# Patient Record
Sex: MALE | Race: WHITE | NOT HISPANIC OR LATINO | URBAN - METROPOLITAN AREA
[De-identification: names, ages, dates, MRNs, and addresses within clinical notes are randomized per-mention and may not be internally consistent; named-entity substitution may affect disease eponyms.]

---

## 2018-10-16 ENCOUNTER — INPATIENT (INPATIENT)
Facility: HOSPITAL | Age: 83
LOS: 6 days | Discharge: ANOTHER HOSPITAL | End: 2018-10-23
Attending: INTERNAL MEDICINE | Admitting: INTERNAL MEDICINE
Payer: MEDICARE

## 2018-10-16 VITALS
TEMPERATURE: 98 F | RESPIRATION RATE: 16 BRPM | HEART RATE: 55 BPM | HEIGHT: 70 IN | OXYGEN SATURATION: 100 % | DIASTOLIC BLOOD PRESSURE: 56 MMHG | WEIGHT: 175.05 LBS | SYSTOLIC BLOOD PRESSURE: 160 MMHG

## 2018-10-16 LAB
ALBUMIN SERPL ELPH-MCNC: 3.6 G/DL — SIGNIFICANT CHANGE UP (ref 3.3–5)
ALP SERPL-CCNC: 50 U/L — SIGNIFICANT CHANGE UP (ref 40–120)
ALT FLD-CCNC: 12 U/L — SIGNIFICANT CHANGE UP (ref 12–78)
ANION GAP SERPL CALC-SCNC: 10 MMOL/L — SIGNIFICANT CHANGE UP (ref 5–17)
ANION GAP SERPL CALC-SCNC: 6 MMOL/L — SIGNIFICANT CHANGE UP (ref 5–17)
ANISOCYTOSIS BLD QL: SLIGHT — SIGNIFICANT CHANGE UP
APPEARANCE UR: CLEAR — SIGNIFICANT CHANGE UP
APPEARANCE UR: CLEAR — SIGNIFICANT CHANGE UP
AST SERPL-CCNC: 15 U/L — SIGNIFICANT CHANGE UP (ref 15–37)
BACTERIA # UR AUTO: ABNORMAL
BACTERIA # UR AUTO: ABNORMAL
BASOPHILS # BLD AUTO: 0.04 K/UL — SIGNIFICANT CHANGE UP (ref 0–0.2)
BASOPHILS NFR BLD AUTO: 0.6 % — SIGNIFICANT CHANGE UP (ref 0–2)
BILIRUB SERPL-MCNC: 0.5 MG/DL — SIGNIFICANT CHANGE UP (ref 0.2–1.2)
BILIRUB UR-MCNC: NEGATIVE — SIGNIFICANT CHANGE UP
BILIRUB UR-MCNC: NEGATIVE — SIGNIFICANT CHANGE UP
BUN SERPL-MCNC: 31 MG/DL — HIGH (ref 7–23)
BUN SERPL-MCNC: 36 MG/DL — HIGH (ref 7–23)
CALCIUM SERPL-MCNC: 8.7 MG/DL — SIGNIFICANT CHANGE UP (ref 8.5–10.1)
CALCIUM SERPL-MCNC: 9 MG/DL — SIGNIFICANT CHANGE UP (ref 8.5–10.1)
CHLORIDE SERPL-SCNC: 108 MMOL/L — SIGNIFICANT CHANGE UP (ref 96–108)
CHLORIDE SERPL-SCNC: 111 MMOL/L — HIGH (ref 96–108)
CO2 SERPL-SCNC: 22 MMOL/L — SIGNIFICANT CHANGE UP (ref 22–31)
CO2 SERPL-SCNC: 26 MMOL/L — SIGNIFICANT CHANGE UP (ref 22–31)
COLOR SPEC: YELLOW — SIGNIFICANT CHANGE UP
COLOR SPEC: YELLOW — SIGNIFICANT CHANGE UP
CREAT SERPL-MCNC: 1.51 MG/DL — HIGH (ref 0.5–1.3)
CREAT SERPL-MCNC: 1.74 MG/DL — HIGH (ref 0.5–1.3)
DACRYOCYTES BLD QL SMEAR: SLIGHT — SIGNIFICANT CHANGE UP
DIFF PNL FLD: ABNORMAL
DIFF PNL FLD: ABNORMAL
EOSINOPHIL # BLD AUTO: 0.17 K/UL — SIGNIFICANT CHANGE UP (ref 0–0.5)
EOSINOPHIL NFR BLD AUTO: 2.6 % — SIGNIFICANT CHANGE UP (ref 0–6)
EPI CELLS # UR: ABNORMAL
EPI CELLS # UR: SIGNIFICANT CHANGE UP
GLUCOSE SERPL-MCNC: 108 MG/DL — HIGH (ref 70–99)
GLUCOSE SERPL-MCNC: 138 MG/DL — HIGH (ref 70–99)
GLUCOSE UR QL: NEGATIVE MG/DL — SIGNIFICANT CHANGE UP
GLUCOSE UR QL: NEGATIVE MG/DL — SIGNIFICANT CHANGE UP
HCT VFR BLD CALC: 33.6 % — LOW (ref 39–50)
HGB BLD-MCNC: 11.2 G/DL — LOW (ref 13–17)
IMM GRANULOCYTES NFR BLD AUTO: 0.2 % — SIGNIFICANT CHANGE UP (ref 0–1.5)
KETONES UR-MCNC: NEGATIVE — SIGNIFICANT CHANGE UP
KETONES UR-MCNC: NEGATIVE — SIGNIFICANT CHANGE UP
LEUKOCYTE ESTERASE UR-ACNC: ABNORMAL
LEUKOCYTE ESTERASE UR-ACNC: NEGATIVE — SIGNIFICANT CHANGE UP
LYMPHOCYTES # BLD AUTO: 1.83 K/UL — SIGNIFICANT CHANGE UP (ref 1–3.3)
LYMPHOCYTES # BLD AUTO: 28.3 % — SIGNIFICANT CHANGE UP (ref 13–44)
MACROCYTES BLD QL: SLIGHT — SIGNIFICANT CHANGE UP
MANUAL SMEAR VERIFICATION: SIGNIFICANT CHANGE UP
MCHC RBC-ENTMCNC: 33.3 GM/DL — SIGNIFICANT CHANGE UP (ref 32–36)
MCHC RBC-ENTMCNC: 35.1 PG — HIGH (ref 27–34)
MCV RBC AUTO: 105.3 FL — HIGH (ref 80–100)
MONOCYTES # BLD AUTO: 0.9 K/UL — SIGNIFICANT CHANGE UP (ref 0–0.9)
MONOCYTES NFR BLD AUTO: 13.9 % — SIGNIFICANT CHANGE UP (ref 2–14)
NEUTROPHILS # BLD AUTO: 3.51 K/UL — SIGNIFICANT CHANGE UP (ref 1.8–7.4)
NEUTROPHILS NFR BLD AUTO: 54.4 % — SIGNIFICANT CHANGE UP (ref 43–77)
NITRITE UR-MCNC: NEGATIVE — SIGNIFICANT CHANGE UP
NITRITE UR-MCNC: POSITIVE
NRBC # BLD: 0 /100 WBCS — SIGNIFICANT CHANGE UP (ref 0–0)
OVALOCYTES BLD QL SMEAR: SLIGHT — SIGNIFICANT CHANGE UP
PH UR: 5 — SIGNIFICANT CHANGE UP (ref 5–8)
PH UR: 6.5 — SIGNIFICANT CHANGE UP (ref 5–8)
PLAT MORPH BLD: NORMAL — SIGNIFICANT CHANGE UP
PLATELET # BLD AUTO: 201 K/UL — SIGNIFICANT CHANGE UP (ref 150–400)
POIKILOCYTOSIS BLD QL AUTO: SLIGHT — SIGNIFICANT CHANGE UP
POLYCHROMASIA BLD QL SMEAR: SLIGHT — SIGNIFICANT CHANGE UP
POTASSIUM SERPL-MCNC: 5.3 MMOL/L — SIGNIFICANT CHANGE UP (ref 3.5–5.3)
POTASSIUM SERPL-MCNC: 5.5 MMOL/L — HIGH (ref 3.5–5.3)
POTASSIUM SERPL-SCNC: 5.3 MMOL/L — SIGNIFICANT CHANGE UP (ref 3.5–5.3)
POTASSIUM SERPL-SCNC: 5.5 MMOL/L — HIGH (ref 3.5–5.3)
PROT SERPL-MCNC: 6.6 GM/DL — SIGNIFICANT CHANGE UP (ref 6–8.3)
PROT UR-MCNC: 15 MG/DL
PROT UR-MCNC: 15 MG/DL
RBC # BLD: 3.19 M/UL — LOW (ref 4.2–5.8)
RBC # FLD: 12.9 % — SIGNIFICANT CHANGE UP (ref 10.3–14.5)
RBC BLD AUTO: ABNORMAL
RBC CASTS # UR COMP ASSIST: >50 /HPF (ref 0–4)
RBC CASTS # UR COMP ASSIST: ABNORMAL /HPF (ref 0–4)
SCHISTOCYTES BLD QL AUTO: SLIGHT — SIGNIFICANT CHANGE UP
SODIUM SERPL-SCNC: 140 MMOL/L — SIGNIFICANT CHANGE UP (ref 135–145)
SODIUM SERPL-SCNC: 143 MMOL/L — SIGNIFICANT CHANGE UP (ref 135–145)
SP GR SPEC: 1.01 — SIGNIFICANT CHANGE UP (ref 1.01–1.02)
SP GR SPEC: 1.01 — SIGNIFICANT CHANGE UP (ref 1.01–1.02)
UROBILINOGEN FLD QL: NEGATIVE MG/DL — SIGNIFICANT CHANGE UP
UROBILINOGEN FLD QL: NEGATIVE MG/DL — SIGNIFICANT CHANGE UP
WBC # BLD: 6.46 K/UL — SIGNIFICANT CHANGE UP (ref 3.8–10.5)
WBC # FLD AUTO: 6.46 K/UL — SIGNIFICANT CHANGE UP (ref 3.8–10.5)
WBC UR QL: ABNORMAL
WBC UR QL: ABNORMAL

## 2018-10-16 PROCEDURE — 70450 CT HEAD/BRAIN W/O DYE: CPT | Mod: 26

## 2018-10-16 PROCEDURE — 99284 EMERGENCY DEPT VISIT MOD MDM: CPT

## 2018-10-16 RX ORDER — ATENOLOL 25 MG/1
50 TABLET ORAL ONCE
Qty: 0 | Refills: 0 | Status: COMPLETED | OUTPATIENT
Start: 2018-10-16 | End: 2018-10-16

## 2018-10-16 RX ORDER — CEFTRIAXONE 500 MG/1
INJECTION, POWDER, FOR SOLUTION INTRAMUSCULAR; INTRAVENOUS
Qty: 0 | Refills: 0 | Status: DISCONTINUED | OUTPATIENT
Start: 2018-10-16 | End: 2018-10-16

## 2018-10-16 RX ORDER — ATENOLOL 25 MG/1
50 TABLET ORAL DAILY
Qty: 0 | Refills: 0 | Status: DISCONTINUED | OUTPATIENT
Start: 2018-10-16 | End: 2018-10-20

## 2018-10-16 RX ORDER — QUETIAPINE FUMARATE 200 MG/1
25 TABLET, FILM COATED ORAL AT BEDTIME
Qty: 0 | Refills: 0 | Status: DISCONTINUED | OUTPATIENT
Start: 2018-10-16 | End: 2018-10-18

## 2018-10-16 RX ORDER — ENOXAPARIN SODIUM 100 MG/ML
40 INJECTION SUBCUTANEOUS DAILY
Qty: 0 | Refills: 0 | Status: DISCONTINUED | OUTPATIENT
Start: 2018-10-16 | End: 2018-10-18

## 2018-10-16 RX ORDER — QUETIAPINE FUMARATE 200 MG/1
12.5 TABLET, FILM COATED ORAL DAILY
Qty: 0 | Refills: 0 | Status: DISCONTINUED | OUTPATIENT
Start: 2018-10-16 | End: 2018-10-17

## 2018-10-16 RX ORDER — AMLODIPINE BESYLATE 2.5 MG/1
10 TABLET ORAL ONCE
Qty: 0 | Refills: 0 | Status: COMPLETED | OUTPATIENT
Start: 2018-10-16 | End: 2018-10-16

## 2018-10-16 RX ORDER — HALOPERIDOL DECANOATE 100 MG/ML
2 INJECTION INTRAMUSCULAR EVERY 4 HOURS
Qty: 0 | Refills: 0 | Status: DISCONTINUED | OUTPATIENT
Start: 2018-10-16 | End: 2018-10-23

## 2018-10-16 RX ORDER — CEFTRIAXONE 500 MG/1
1000 INJECTION, POWDER, FOR SOLUTION INTRAMUSCULAR; INTRAVENOUS EVERY 24 HOURS
Qty: 0 | Refills: 0 | Status: DISCONTINUED | OUTPATIENT
Start: 2018-10-16 | End: 2018-10-18

## 2018-10-16 RX ORDER — AMLODIPINE BESYLATE 2.5 MG/1
10 TABLET ORAL DAILY
Qty: 0 | Refills: 0 | Status: DISCONTINUED | OUTPATIENT
Start: 2018-10-16 | End: 2018-10-23

## 2018-10-16 RX ORDER — HALOPERIDOL DECANOATE 100 MG/ML
2 INJECTION INTRAMUSCULAR ONCE
Qty: 0 | Refills: 0 | Status: COMPLETED | OUTPATIENT
Start: 2018-10-16 | End: 2018-10-16

## 2018-10-16 RX ORDER — SODIUM CHLORIDE 9 MG/ML
1000 INJECTION INTRAMUSCULAR; INTRAVENOUS; SUBCUTANEOUS
Qty: 0 | Refills: 0 | Status: DISCONTINUED | OUTPATIENT
Start: 2018-10-16 | End: 2018-10-17

## 2018-10-16 RX ORDER — HALOPERIDOL DECANOATE 100 MG/ML
5 INJECTION INTRAMUSCULAR ONCE
Qty: 0 | Refills: 0 | Status: COMPLETED | OUTPATIENT
Start: 2018-10-16 | End: 2018-10-16

## 2018-10-16 RX ADMIN — HALOPERIDOL DECANOATE 2 MILLIGRAM(S): 100 INJECTION INTRAMUSCULAR at 19:10

## 2018-10-16 RX ADMIN — HALOPERIDOL DECANOATE 2 MILLIGRAM(S): 100 INJECTION INTRAMUSCULAR at 15:27

## 2018-10-16 RX ADMIN — SODIUM CHLORIDE 100 MILLILITER(S): 9 INJECTION INTRAMUSCULAR; INTRAVENOUS; SUBCUTANEOUS at 21:10

## 2018-10-16 RX ADMIN — Medication 2 MILLIGRAM(S): at 04:40

## 2018-10-16 RX ADMIN — HALOPERIDOL DECANOATE 2 MILLIGRAM(S): 100 INJECTION INTRAMUSCULAR at 23:03

## 2018-10-16 RX ADMIN — HALOPERIDOL DECANOATE 5 MILLIGRAM(S): 100 INJECTION INTRAMUSCULAR at 04:40

## 2018-10-16 RX ADMIN — CEFTRIAXONE 1000 MILLIGRAM(S): 500 INJECTION, POWDER, FOR SOLUTION INTRAMUSCULAR; INTRAVENOUS at 16:38

## 2018-10-16 NOTE — H&P ADULT - NSHPLABSRESULTS_GEN_ALL_CORE
11.2   6.46  )-----------( 201      ( 16 Oct 2018 02:28 )             33.6     10-16    143  |  111<H>  |  36<H>  ----------------------------<  108<H>  5.5<H>   |  26  |  1.74<H>    Ca    9.0      16 Oct 2018 02:28    TPro  6.6  /  Alb  3.6  /  TBili  0.5  /  DBili  x   /  AST  15  /  ALT  12  /  AlkPhos  50  10-16    LIVER FUNCTIONS - ( 16 Oct 2018 02:28 )  Alb: 3.6 g/dL / Pro: 6.6 gm/dL / ALK PHOS: 50 U/L / ALT: 12 U/L / AST: 15 U/L / GGT: x           < from: CT Head No Cont (10.16.18 @ 02:39) >    IMPRESSION:   No acute intracranial hemorrhage, mass effect, or evidenceof acute   vascular territorial infarction.    If clinical symptoms persist or worsen, more sensitive evaluation with   brain MRI may be obtained, if no contraindications exist.    < end of copied text >

## 2018-10-16 NOTE — ED PROVIDER NOTE - MEDICAL DECISION MAKING DETAILS
pt with dementia living at home with wife as care taker, increase in agitation, not cooperative at home, will get baseline labs, ct head and ua will get sw consult in the am

## 2018-10-16 NOTE — ED ADULT NURSE NOTE - NSFALLRSKPASTHIST_ED_ALL_ED
Pt here c/o edema to right side of penis. He had I/D done yesterday at 1530. Denies pain and did not notice till 1 hour PTA. States heating pad decreased the majority of sx   no

## 2018-10-16 NOTE — PROGRESS NOTE ADULT - SUBJECTIVE AND OBJECTIVE BOX
F/u bladder scan  has 614 cc w/bladder scan, UA +; staff / spouse reports pt c/o pain with urination (urinated minute amt)  A/p   urinary retention with CORINA d/t UTI  - keep beaulieu  - start IV rocephin  - f/u Ucx  - con't IVF

## 2018-10-16 NOTE — GOALS OF CARE CONVERSATION - PERSONAL ADVANCE DIRECTIVE - CONVERSATION DETAILS
Met with family to discuss medical issues and overall GOC. They explained that before this pt was doing quite well. He has had dementia for many years, more recently progressing to include sundowning in evenings. Wife noted one episode of confusion that led to pt climbing out of window and falling on rocks where police were called (2016) but no such occurrence since then, until today. They note that at baseline, pt is confused, sometimes recognizing loved ones sometimes not, mostly confabulating, but pleasant and alert. He was able to walk without assistive devices, dress, toilet (no incontinence), and feed himself. Neighbors and nieces/nephews have been checking in from time to time and sometimes relieve wife when she needs to go out and do things. However, she is worried that if this behavior persists she will not be able to care for him. She would like him home ultimately, but needs help with planning.     Reviewed medical issues, all labs thus far, stef, waiting for UA as UTIs can cause delirium in the elderly, and also plans for PT. Also reviewed approach to symptom (delirium) management which they were able to understand and agreed with. They agreed that workup needed to be completed and PT evaluation in place before truly knowing pt's trajectory.     Offered to review options for further support which they were fully open to. They noted understanding that dementia is a progressive illness. Reviewed all levels of home care including baseline home care (with skilled nursing needs, PT if recommended), home palliative care (if [facility] not recommended/eligible for home care this could help with sx management), and lastly, if dementia progressed to specific levels of cognitive and functional decline, eligibility for hospice. Explained that at this time, with above outlined baseline function pt is not likely to fit hospice criteria, however explained that if this changes they could inquire about assessment/referral at any time in the future. Spent time dispelling some myths that wife had about hospice, including that it must take place in a facility. Reviewed full benefit profile for their knowledge in the future.     Given above, spent time focusing on plans that pt would be more likely to be eligible for. Noted need for PT assessment to dictate if pt should return home with PT or go to [facility]. The latter giving family more time to plan with social workers (including medicaid specialists if eligible) for safe home plan. Joshua notes that she could provide pt with home health aide care, as she has done this in the past. However, she agreed that wife should have more information about this. Explained that aides are usually an out-of-pocket expense unless they had medicaid or hospice. However, reassured her that we would help link her up with social work to support her in figuring out these options further. Likewise, they agreed with us offering to meet again once workup was complete and PT eval was done to help further streamline planning including completion of MOLST form.     In light of progressive illness and need for assistance, spent some time discussing advanced directives. Mrs. Salazar denied pt having a proxy, but noted that she was aware of his wishes. When asked, she noted that she would not want pt to have CPR or intubation, not wanting him to endure that and willing to sign paperwork to this effect at end of encounter. She and her niece both agreed that completing a MOLST prior to dc would help further clarify these wishes for clinicians in all settings in the future.     Pt's wife shared that she was overwhelmed by all that has already happened and felt a little disappointed by not being able to help her . Normalized this emotion and let her know that everyone has their limits, including physicians, and that there is no shame in asking for help. Reassured her that teams here are committed to helping her through this process as best we can, which she was appreciative of.     Plan to c/w current interventions and workup and meet again later in week to finalize wishes/plan once trajectory is clearer.

## 2018-10-16 NOTE — ED PROVIDER NOTE - CONSTITUTIONAL, MLM
normal... Well appearing, well nourished, awake, alert, oriented to person,  and in no apparent distress. pleasantly confused and cooperative

## 2018-10-16 NOTE — CONSULT NOTE ADULT - ASSESSMENT
89y old Male coming from home with hx of Dementia (since 2008, FAST 5), HTN, admitted 10/16 for worsening confusion. As per wife and niece pt's confusion has worsened recently mostly confused at nights and less in the day time, does not recognize her or family members, but his confusion has worsened within the past 24 hours. Spouse and niece note that pt is ambulatory at baseline (without any device), able to dress, toilet, and feed self. Found here to have mild elevation in Cr (1.7), hyperkalemia (5.5), and CTH negative for acute pathology, pending urinary studies with 600cc on bedside bladder scan. Palliative Care consulted to assist with establishing GOC.     1) AMS/Dementia  - hx of dementia, FAST5-6, not end stage yet  - CTH ruling out any acute pathology  - likely sundowning  - possibly also contributed to by infection, awaiting UA  - agree with seroquel 25mg qhs and 12.5mg qd  - also agree with back up dose of haldol 2mg IM q4h prn    2) CORINA  - likely prerenal  - IVF  - encourage po intake, which wife says he usually drinks well  - c/w monitoring    3) Debility  - seems to have good physical function at home until today  - some evidence of muscle/fat wasting  - rec PT eval cc: [facility]    4) Prognosis  - likely overall poor  - progressive dementia, though not yet at end stage. Unclear though what new baseline will be. If does not rally or continues to decline would possibly then be a hospice candidate, not so at this time.     5) GOC/Advanced Directives  - pt does not have capacity for decision making  - no HCP, surrogate is wife: Vivienne: [phone]  - DNR and DNI, completed today  - GOC meeting held- family hoping for support in figuring out how best to support him after this, interested in [facility] assessment and open to full discussion of all options. *Spent 40 minutes discussing GOC with family including Advance care planning, explanation and discussion of advance directives, reviewed all dispo options, broached option to complete MOLST if plan for [facility], and finalized DNR/DNI form. See GOC note for further details.    Thank you for including us in Mr. Salazar's care. Will continue to follow with you.    Codey Stubbs MD  Palliative Care Attending

## 2018-10-16 NOTE — ED PROVIDER NOTE - PROGRESS NOTE DETAILS
[place] GHOSH: Received s/o from Dr. Sellers- per - recommending medical admission at this time. Endorsed to hospitalist; still pending urine at this time.

## 2018-10-16 NOTE — ED ADULT NURSE NOTE - NSIMPLEMENTINTERV_GEN_ALL_ED
Implemented All Fall with Harm Risk Interventions:  [place] to call system. Call bell, personal items and telephone within reach. Instruct patient to call for assistance. Room bathroom lighting operational. Non-slip footwear when patient is off stretcher. Physically safe environment: no spills, clutter or unnecessary equipment. Stretcher in lowest position, wheels locked, appropriate side rails in place. Provide visual cue, wrist band, yellow gown, etc. Monitor gait and stability. Monitor for mental status changes and reorient to person, place, and time. Review medications for side effects contributing to fall risk. Reinforce activity limits and safety measures with patient and family. Provide visual clues: red socks.

## 2018-10-16 NOTE — CONSULT NOTE ADULT - SUBJECTIVE AND OBJECTIVE BOX
HPI: Mr. Salazar is a 89y old Male coming from home with hx of Dementia (since 2008, FAST 5), HTN, admitted 10/16 for worsening confusion. As per wife and niece pt's confusion has worsened recently mostly confused at nights and less in the day time, does not recognize her or family members, but his confusion has worsened within the past 24 hours. Spouse and niece note that pt is ambulatory at baseline (without any device), able to dress, toilet, and feed self. Found here to have mild elevation in Cr (1.7), hyperkalemia (5.5), and CTH negative for acute pathology, pending urinary studies with 600cc on bedside bladder scan. Palliative Care consulted to assist with establishing GOC.     Met Mr. Salazar, wife, niece, and hospital aid at bedside. Pt mildly agitated, moving around in bed, but easily calmed and refocused. He denied knowing where he was, time (date or president), only able to orient to self. Denied any pain or discomfort. He was able to follow simple commands and was pleasant. He was unable to contribute to HPI, family willing to meet separately to discuss GOC, see GOC note for additional details.       PAIN: ( )Yes   (x )No  DYSPNEA: ( ) Yes  (x ) No    PAST MEDICAL & SURGICAL HISTORY:  Essential hypertension  Dementia  No significant past surgical history      SOCIAL HX: Lives with his wife, nieces and nephews live close by    Hx opiate tolerance ( )YES  (x )NO    Baseline ADLs  (Prior to Admission)- as described above, though does not handle IADLs  (x ) Independent   ( )Dependent    FAMILY HISTORY:  Unable to gather 2/2 to dementia      Review of Systems:    Anxiety- yes  Nausea- denies  Delirium- yes    Otherwise unable to gather 2/2 to dementia      PHYSICAL EXAM:    Vital Signs Last 24 Hrs  T(C): 36.5 (16 Oct 2018 00:40), Max: 36.5 (16 Oct 2018 00:40)  T(F): 97.7 (16 Oct 2018 00:40), Max: 97.7 (16 Oct 2018 00:40)  HR: 54 (16 Oct 2018 11:23) (52 - 55)  BP: 142/64 (16 Oct 2018 11:23) (142/64 - 160/56)  BP(mean): --  RR: 18 (16 Oct 2018 08:05) (16 - 18)  SpO2: 99% (16 Oct 2018 11:23) (99% - 100%)  Daily Height in cm: 177.8 (16 Oct 2018 00:40)    Daily     PPSV2: 30  %  FAST: 5    General: Elderly male sitting up in bed, climbing somewhat on rails, smiling, cooperative when redirected follows instructions, but easily distracted  Mental Status: AOx1 (self only)  HEENT: dmm, perrl, eomi, some temporal wasting  Lungs: clear  Cardiac: +s1 s2 rrr  GI: soft nt nd +bs  : retaining urine  Ext: moves all extremities, no edema  Neuro: no focal deficits outside of confusion      LABS:                        11.2   6.46  )-----------( 201      ( 16 Oct 2018 02:28 )             33.6     10-16    143  |  111<H>  |  36<H>  ----------------------------<  108<H>  5.5<H>   |  26  |  1.74<H>    Ca    9.0      16 Oct 2018 02:28    TPro  6.6  /  Alb  3.6  /  TBili  0.5  /  DBili  x   /  AST  15  /  ALT  12  /  AlkPhos  50  10-16      Albumin: Albumin, Serum: 3.6 g/dL (10-16 @ 02:28)      Allergies    No Known Allergies    Intolerances      MEDICATIONS  (STANDING):  amLODIPine   Tablet 10 milliGRAM(s) Oral daily  ATENolol  Tablet 50 milliGRAM(s) Oral daily  enoxaparin Injectable 40 milliGRAM(s) SubCutaneous daily  haloperidol    Injectable 2 milliGRAM(s) IntraMuscular once  QUEtiapine 12.5 milliGRAM(s) Oral daily  QUEtiapine 25 milliGRAM(s) Oral at bedtime  sodium chloride 0.9%. 1000 milliLiter(s) (100 mL/Hr) IV Continuous <Continuous>    MEDICATIONS  (PRN):  haloperidol    Injectable 2 milliGRAM(s) IntraMuscular every 4 hours PRN agitation      RADIOLOGY/ADDITIONAL STUDIES:    EXAM:  CT BRAIN                        PROCEDURE DATE:  10/16/2018      IMPRESSION:     No acute intracranial hemorrhage, mass effect, or evidence of acute   vascular territorial infarction.    If clinical symptoms persist or worsen, more sensitive evaluation with   brain MRI may be obtained, if no contraindications exist.      YUSUF JERNIGAN M.D., ATTENDING RADIOLOGIST  This document has been electronically signed. Oct 16 2018  4:18AM

## 2018-10-16 NOTE — ED ADULT NURSE NOTE - OBJECTIVE STATEMENT
pt presents to the ED with wife stating patient has been disoriented since 2008. Never diagnosed with Alzheimer's or dementia. Wife states since 2008 patients condition has worsened and tonight patient wanted to get out of house and she was worried and did not know how to handle him. Wife states she lives at home alone with , that he no longer recognizes her and that she is unsure of how to care for him. Patient calm and cooperative. pt offers no complaints.

## 2018-10-16 NOTE — H&P ADULT - NSHPPHYSICALEXAM_GEN_ALL_CORE
PHYSICAL EXAM:    GENERAL: active / mild to moderate agitated in bed NAD    HEENT:  pupils equal and reactive, no oropharyngeal lesions, erythema, exudates, oral thrush    NECK:   supple, no carotid bruits, no palpable lymph nodes, no thyromegaly    CV:  +S1, +S2, regular    RESP:   lungs clear to auscultation bilaterally, no wheezing, rales, rhonchi, good air entry bilaterally    BREAST:  not examined    GI:  abdomen soft, non-tender, non-distended, normal BS, no bruits, no abdominal masses, no palpable masses    RECTAL:  not examined    :  not examined    MSK:  +kyphosis, normal muscle tone, no atrophy, no rigidity, no contractions    EXT: chronic vascular changes to BLE,  no clubbing, no cyanosis, no edema, no calf pain, swelling or erythema    VASCULAR:  pulses equal and symmetric in the upper and lower extremities    NEURO:  AAOX3, , no pronation drift, lifts and resist BUE/BLE strongly 5/5, tongue midline, no focal neurological deficits, follows all commands, able to move extremities spontaneously    SKIN:  no ulcers, lesions or rashes PHYSICAL EXAM:    GENERAL: active / mild to moderate agitated in bed NAD    HEENT:  pupils equal and reactive, no oropharyngeal lesions, erythema, exudates, oral thrush    NECK:   supple, no carotid bruits, no palpable lymph nodes, no thyromegaly    CV:  +S1, +S2, regular    RESP:   lungs clear to auscultation bilaterally, no wheezing, rales, rhonchi, good air entry bilaterally    BREAST:  not examined    GI:  abdomen soft, non-distended, normal BS, no bruits, no abdominal masses, no palpable masses    RECTAL:  not examined    :  no indication of suprapubic tenderness    MSK:  normal muscle tone, no atrophy, no rigidity, no contractions    EXT:  no clubbing, no cyanosis, no edema, no swelling or erythema    VASCULAR:  pulses equal and symmetric in the upper and lower extremities    NEURO:  AAOX1 (name) does not follow commands well, AROM, active in bed    SKIN:  no ulcers, lesions or rashes

## 2018-10-16 NOTE — H&P ADULT - NSHPREVIEWOFSYSTEMS_GEN_ALL_CORE
REVIEW OF SYSTEMS: Info gathered from pt's spouse, and niece "Brian" as pt is clinically unable to relay information    General: no fever, chills    Skin/Breast: no changes  	  Ophthalmologic: no vision changes  	  ENMT:  no issues    Respiratory and Thorax: no cough or recent URI's  	  Cardiovascular: no issues, no hx of CVD    Gastrointestinal: normal BM, no issues    Genitourinary: incontinent at times    Musculoskeletal: no joint swelling or tenderness    Neurological: dementia, progressively worsening at its worst past months at its worst overnight    Psychiatric: no suicidal hx no depression/anxiety	    Hematology/Lymphatics: normal	    Endocrine: normal    Allergic/Immunologic:	      REVIEW OF SYSTEM: ROS comprehensively negative except as above

## 2018-10-16 NOTE — H&P ADULT - ATTENDING COMMENTS
Patient is seen and examined, labs and imaging studies reviewed. Plan of care d/w NP Linda, wife and daughter at bedside  Start Seroquel standing +Haldol PRN for agitation  SW eval for placement  IVF and bladder scan  Monitor renal function    Agree with above H&P

## 2018-10-16 NOTE — H&P ADULT - ASSESSMENT
AMS likely worsening Alzheimers' / Dementia with behavior symptoms vs metabolic encephalopathy w/ borderline K and CORINA  - pt with worsening AMS with past "few days" as per spouse & Niece-->"I saw him last week and he was fine"  - 1:1 at bedside reports pt has been trying to urinate but ''says its not coming"  - bladder scan--> straight cath if >200 & check UA  - start seroquel 12.5 mg am / 25 mg HS; haldol PRN   - safety precautions / 1:1  - monitor  - SW / case management    CORINA vs CORINA on CKD (crt 1.74)  - baseline crt unk  - spouse reports his po intake has been good until yesterday PM he "didn't eat anything"  - receck BMP    Essential HTN  - BP slightly above goal  - con't home meds  - managed agitation     IMPROVE VTE Individual Risk Assessment    RISK                                                                Points    [  ] Previous VTE                                                  3    [  ] Thrombophilia                                               2    [  ] Lower limb paralysis                                      2        (unable to hold up >15 seconds)      [  ] Current Cancer                                              2         (within 6 months)    [  ] Immobilization > 24 hrs                                1    [  ] ICU/CCU stay > 24 hours                              1    [ x ] Age > 60                                                      1    IMPROVE VTE Score _________    DVT PPX  - coumadin    Dispo  - goals of care d/w pt's spouse -->DNR but wishes goals of care conversation with palliative team first  - plan of care d/w pt's spouse and niece at bedside AMS likely worsening Alzheimers' / Dementia with behavior symptoms vs metabolic encephalopathy w/ borderline K and CORINA  - pt with worsening AMS with past "few days" as per spouse & Niece-->"I saw him last week and he was fine"  - 1:1 at bedside reports pt has been trying to urinate but ''says its not coming"  - bladder scan--> straight cath if >200 & check UA  - start seroquel 12.5 mg am / 25 mg HS; haldol PRN   - safety precautions / 1:1  - monitor  - SW / case management    CORINA vs CORINA on CKD (crt 1.74)  - baseline crt unk  - spouse reports his po intake has been good until yesterday PM he "didn't eat anything"  - receck BMP    Essential HTN  - BP slightly above goal  - con't home meds  - managed agitation     IMPROVE VTE Individual Risk Assessment    RISK                                                                Points    [  ] Previous VTE                                                  3    [  ] Thrombophilia                                               2    [  ] Lower limb paralysis                                      2        (unable to hold up >15 seconds)      [  ] Current Cancer                                              2         (within 6 months)    [  ] Immobilization > 24 hrs                                1    [  ] ICU/CCU stay > 24 hours                              1    [ x ] Age > 60                                                      1    IMPROVE VTE Score _________    DVT PPX  - lovenox    Dispo  - goals of care d/w pt's spouse -->DNR but wishes goals of care conversation with palliative team first  - plan of care d/w pt's spouse and niece at bedside

## 2018-10-16 NOTE — H&P ADULT - HISTORY OF PRESENT ILLNESS
89 year old Man with pmhx of HTN, dementia, BIBA accompanied by spouse for worsening confusion. As per wife and niece pt's confusion has worsened recently. Spouse reports pt is usually very confused at nights and less in the day time, does not recognize her or family members, but his confusion has worsened within the past 24 hours. Spouse and niece that pt is ambulatory at baseline, able to toilet self   and feed self.     In ED CT head negative for acute pathology, K 5.5, Crt 1.73, /56

## 2018-10-16 NOTE — ED ADULT NURSE REASSESSMENT NOTE - NS ED NURSE REASSESS COMMENT FT1
Patient had become agitated and attempted to climb out of bed and pull out the beaulieu catheter.  Patient medicated at this time.  Beaulieu catheter placement verified patient noted to have small amount of blood at meatus.  Patient given turkey dinner and assisted to eat.  Family at bedside at this time aware of plan of care  Patient maintained on 1:1 observation.  Beaulieu catheter emptied 800cc.  Report given to Tunde SILVERIO .
patient resting comfortably.  Incontinent of stool linens changed and skin care given patient repositioned.  Right IV placed and BMP drawn.  Bladder scan done 614cc of urine noted in bladder.  Jimenez Catheter placed for urinary retention.  350cc of cloudy yellow urine returned.  U/A and C&S sent to lab.  Patient family at bedside aware of plan of care.  VS stable as charted will continue to monitor patient condition.
pt assisted to bathroom, unable to urinate. pt refused straight cath. gave pt 2 glasses of water and will attempt again later.
CO maintained
Pt resting in bed 7 . 1;1 at bed side for safety . Iv site is clear . Family at bed side helpful with care.  Call bell is with in reach and safety is being maintained . Will monitor till transport.  Report given to TCU

## 2018-10-16 NOTE — ED ADULT TRIAGE NOTE - CHIEF COMPLAINT QUOTE
Wife states patient has been disoriented since 2008. Never officially diagnosed with Alzheimer's or dementia. Wife states since 2008 patients condition has worsened and tonight patient wanted to get out of house and she was worried and did not know how to handle him. Wife states she lives at home alone with , that he no longer recognizes her and that she is unsure of how to care for him. Patient calm and cooperative at triage, no distress noted. No complaints.

## 2018-10-17 LAB
ANION GAP SERPL CALC-SCNC: 9 MMOL/L — SIGNIFICANT CHANGE UP (ref 5–17)
BUN SERPL-MCNC: 26 MG/DL — HIGH (ref 7–23)
CALCIUM SERPL-MCNC: 8.8 MG/DL — SIGNIFICANT CHANGE UP (ref 8.5–10.1)
CHLORIDE SERPL-SCNC: 112 MMOL/L — HIGH (ref 96–108)
CO2 SERPL-SCNC: 24 MMOL/L — SIGNIFICANT CHANGE UP (ref 22–31)
CREAT SERPL-MCNC: 1.21 MG/DL — SIGNIFICANT CHANGE UP (ref 0.5–1.3)
CULTURE RESULTS: NO GROWTH — SIGNIFICANT CHANGE UP
GLUCOSE SERPL-MCNC: 89 MG/DL — SIGNIFICANT CHANGE UP (ref 70–99)
POTASSIUM SERPL-MCNC: 4.6 MMOL/L — SIGNIFICANT CHANGE UP (ref 3.5–5.3)
POTASSIUM SERPL-SCNC: 4.6 MMOL/L — SIGNIFICANT CHANGE UP (ref 3.5–5.3)
SODIUM SERPL-SCNC: 145 MMOL/L — SIGNIFICANT CHANGE UP (ref 135–145)
SPECIMEN SOURCE: SIGNIFICANT CHANGE UP
TSH SERPL-MCNC: 2.36 UU/ML — SIGNIFICANT CHANGE UP (ref 0.34–4.82)

## 2018-10-17 RX ORDER — SODIUM CHLORIDE 9 MG/ML
1000 INJECTION INTRAMUSCULAR; INTRAVENOUS; SUBCUTANEOUS
Qty: 0 | Refills: 0 | Status: DISCONTINUED | OUTPATIENT
Start: 2018-10-17 | End: 2018-10-23

## 2018-10-17 RX ORDER — QUETIAPINE FUMARATE 200 MG/1
12.5 TABLET, FILM COATED ORAL DAILY
Qty: 0 | Refills: 0 | Status: DISCONTINUED | OUTPATIENT
Start: 2018-10-17 | End: 2018-10-22

## 2018-10-17 RX ADMIN — CEFTRIAXONE 1000 MILLIGRAM(S): 500 INJECTION, POWDER, FOR SOLUTION INTRAMUSCULAR; INTRAVENOUS at 17:05

## 2018-10-17 RX ADMIN — SODIUM CHLORIDE 100 MILLILITER(S): 9 INJECTION INTRAMUSCULAR; INTRAVENOUS; SUBCUTANEOUS at 06:23

## 2018-10-17 RX ADMIN — HALOPERIDOL DECANOATE 2 MILLIGRAM(S): 100 INJECTION INTRAMUSCULAR at 23:01

## 2018-10-17 RX ADMIN — QUETIAPINE FUMARATE 25 MILLIGRAM(S): 200 TABLET, FILM COATED ORAL at 22:06

## 2018-10-17 RX ADMIN — ATENOLOL 50 MILLIGRAM(S): 25 TABLET ORAL at 06:05

## 2018-10-17 RX ADMIN — AMLODIPINE BESYLATE 10 MILLIGRAM(S): 2.5 TABLET ORAL at 06:05

## 2018-10-17 RX ADMIN — HALOPERIDOL DECANOATE 2 MILLIGRAM(S): 100 INJECTION INTRAMUSCULAR at 09:55

## 2018-10-17 RX ADMIN — ENOXAPARIN SODIUM 40 MILLIGRAM(S): 100 INJECTION SUBCUTANEOUS at 12:28

## 2018-10-17 RX ADMIN — QUETIAPINE FUMARATE 25 MILLIGRAM(S): 200 TABLET, FILM COATED ORAL at 01:39

## 2018-10-17 NOTE — PROGRESS NOTE ADULT - SUBJECTIVE AND OBJECTIVE BOX
HPI: Pt seen and examined this afternoon, wife, niece, and 1:1 at bedside. Pt calmly sitting in chair, confused but pleasant. Denies discomfort. As per 1:1 and family, pt needed one dose of IV haldol today (around 10am) a little restless but better today than yesterday.     Met separately with family as planned given RAIZA rec from PT. They confirmed interest in pursuing RAIZA, let RN know who will let SW know to meet with them so they can share their choices. As planned we reviewed MOLST form and completed this in prep for this plan. Reviewed medical updates and team members as requested also. See below for full list of MOLST decisions.       PAIN: no  DYSPNEA: no      ROS:    Limited by mentation      PHYSICAL EXAM:    Vital Signs Last 24 Hrs  T(C): 36.3 (17 Oct 2018 10:50), Max: 36.8 (16 Oct 2018 19:25)  T(F): 97.4 (17 Oct 2018 10:50), Max: 98.2 (16 Oct 2018 19:25)  HR: 55 (17 Oct 2018 10:50) (54 - 71)  BP: 92/40 (17 Oct 2018 10:50) (92/40 - 146/37)  BP(mean): --  RR: 18 (17 Oct 2018 10:50) (16 - 18)  SpO2: 100% (17 Oct 2018 10:50) (98% - 100%)  Daily     Daily     PPSV2: 30  %    General: Elderly male sitting up in chair, smiling, cooperative when redirected follows instructions, but easily distracted  Mental Status: AOx1 (self only)  HEENT: dmm, perrl, eomi, some temporal wasting  Lungs: clear  Cardiac: +s1 s2 rrr  GI: soft nt nd +bs  : beaulieu in place draining yellow urine  Ext: moves all extremities, no edema  Neuro: no focal deficits outside of confusion    LABS:                        11.2   6.46  )-----------( 201      ( 16 Oct 2018 02:28 )             33.6     10-17    145  |  112<H>  |  26<H>  ----------------------------<  89  4.6   |  24  |  1.21    Ca    8.8      17 Oct 2018 05:44    TPro  6.6  /  Alb  3.6  /  TBili  0.5  /  DBili  x   /  AST  15  /  ALT  12  /  AlkPhos  50  10-16      Albumin: Albumin, Serum: 3.6 g/dL (10-16 @ 02:28)      Allergies    No Known Allergies    Intolerances      MEDICATIONS  (STANDING):  amLODIPine   Tablet 10 milliGRAM(s) Oral daily  ATENolol  Tablet 50 milliGRAM(s) Oral daily  cefTRIAXone Injectable. 1000 milliGRAM(s) IV Push every 24 hours  enoxaparin Injectable 40 milliGRAM(s) SubCutaneous daily  QUEtiapine 25 milliGRAM(s) Oral at bedtime  sodium chloride 0.9%. 1000 milliLiter(s) (50 mL/Hr) IV Continuous <Continuous>    MEDICATIONS  (PRN):  haloperidol    Injectable 2 milliGRAM(s) IntraMuscular every 4 hours PRN agitation  QUEtiapine 12.5 milliGRAM(s) Oral daily PRN agitation

## 2018-10-17 NOTE — PHYSICAL THERAPY INITIAL EVALUATION ADULT - PERTINENT HX OF CURRENT PROBLEM, REHAB EVAL
a 89y old Male coming from home with hx of Dementia (since 2008, FAST 5), HTN, admitted 10/16 for worsening confusion. As per wife and niece pt's confusion has worsened recently mostly confused at nights and less in the day time, does not recognize her or family members, but his confusion has worsened within the past 24 hours. Spouse and niece note that pt is ambulatory at baseline (without any device), able to dress, toilet, and feed self.

## 2018-10-17 NOTE — PHYSICAL THERAPY INITIAL EVALUATION ADULT - RANGE OF MOTION EXAMINATION, REHAB EVAL
bilateral upper extremity ROM was WFL (within functional limits)/bilateral lower extremity ROM was WFL (within functional limits)/except right shoulder elevation limited 0-90 deg

## 2018-10-17 NOTE — PHYSICAL THERAPY INITIAL EVALUATION ADULT - ADDITIONAL COMMENTS
As per niece, pt was negotiating his home independently without AD and even driving as recently as 2 weeks ago, but he has experienced a recent functional decline in tandem with deteriorating mentation and has become increasingly unsafe.

## 2018-10-17 NOTE — CONSULT NOTE ADULT - SUBJECTIVE AND OBJECTIVE BOX
Patient is a 89y old  Male who presents with a chief complaint of Worsening confusion (16 Oct 2018 15:33)    HPI:  89 year old Man with pmhx of HTN, dementia, admitted on 10/16 for evaluation of worsening confusion, not able to recognize family members, intermittently agitated; patient sitting in chair, denies any specific complaints, however history per medical record.            PMH: as above  PSH: as above  Meds: per reconciliation sheet, noted below  MEDICATIONS  (STANDING):  amLODIPine   Tablet 10 milliGRAM(s) Oral daily  ATENolol  Tablet 50 milliGRAM(s) Oral daily  cefTRIAXone Injectable. 1000 milliGRAM(s) IV Push every 24 hours  enoxaparin Injectable 40 milliGRAM(s) SubCutaneous daily  QUEtiapine 12.5 milliGRAM(s) Oral daily  QUEtiapine 25 milliGRAM(s) Oral at bedtime  sodium chloride 0.9%. 1000 milliLiter(s) (100 mL/Hr) IV Continuous <Continuous>    MEDICATIONS  (PRN):  haloperidol    Injectable 2 milliGRAM(s) IntraMuscular every 4 hours PRN agitation    Allergies    No Known Allergies    Intolerances      Social: no smoking, no alcohol, no illegal drugs; no recent travel, no exposure to TB  FAMILY HISTORY:  No pertinent family history in first degree relatives     no history of premature cardiovascular disease in first degree relatives  ROS: unable to obtain secondary to patient medical condition     Vital Signs Last 24 Hrs  T(C): 36.8 (17 Oct 2018 06:01), Max: 36.8 (16 Oct 2018 19:25)  T(F): 98.2 (17 Oct 2018 06:01), Max: 98.2 (16 Oct 2018 19:25)  HR: 69 (17 Oct 2018 06:01) (54 - 71)  BP: 146/37 (17 Oct 2018 06:01) (126/66 - 146/37)  BP(mean): --  RR: 16 (16 Oct 2018 20:30) (16 - 18)  SpO2: 100% (17 Oct 2018 06:01) (98% - 100%)  Daily     Daily     PE:    Constitutional: frail looking  HEENT: NC/AT, EOMI, PERRLA, conjunctivae clear; ears and nose atraumatic; pharynx clear  Neck: supple; thyroid not palpable  Back: no tenderness  Respiratory: respiratory effort normal; clear to auscultation  Cardiovascular: S1S2 regular, no murmurs  Abdomen: soft, not tender, not distended, positive BS; no liver or spleen organomegaly  Genitourinary: no suprapubic tenderness  Musculoskeletal: no muscle tenderness, no joint swelling or tenderness  Neurological/ Psychiatric: confused; moving all extremities  Skin: no rashes; no palpable lesions    Labs: all available labs reviewed                        11.2   6.46  )-----------( 201      ( 16 Oct 2018 02:28 )             33.6     10-17    145  |  112<H>  |  26<H>  ----------------------------<  89  4.6   |  24  |  1.21    Ca    8.8      17 Oct 2018 05:44    TPro  6.6  /  Alb  3.6  /  TBili  0.5  /  DBili  x   /  AST  15  /  ALT  12  /  AlkPhos  50  10-16     LIVER FUNCTIONS - ( 16 Oct 2018 02:28 )  Alb: 3.6 g/dL / Pro: 6.6 gm/dL / ALK PHOS: 50 U/L / ALT: 12 U/L / AST: 15 U/L / GGT: x           Urinalysis Basic - ( 16 Oct 2018 15:21 )    Color: Yellow / Appearance: Clear / S.015 / pH: x  Gluc: x / Ketone: Negative  / Bili: Negative / Urobili: Negative mg/dL   Blood: x / Protein: 15 mg/dL / Nitrite: Negative   Leuk Esterase: Negative / RBC: >50 /HPF / WBC 6-10   Sq Epi: x / Non Sq Epi: Occasional / Bacteria: Occasional    < from: CT Head No Cont (10.16. @ 02:39) >    EXAM:  CT BRAIN                            PROCEDURE DATE:  10/16/2018          INTERPRETATION:  CLINICAL INFORMATION: Altered mental status.    COMPARISON: None available.    PROCEDURE:   Noncontrast CT of the Head was performed from the skull base to the   vertex. Coronal and Sagittal reformats were obtained.    FINDINGS:    There is no acute intracranial hemorrhage, mass effect, midline shift,   extra-axial collection, hydrocephalus, or evidence of acute vascular   territorial infarction. Mild patchy hypodensities within the   periventricular and subcortical white matter, although nonspecific,   likely reflect chronic microvascular disease. Cerebral volume loss   results in prominence of the ventricles and sulci. Intracranial   atherosclerosis is present.    The visualized paranasal sinuses and mastoid air cells are clear.   Intracranial contents are unremarkable. Visualized osseous structures are   intact.    IMPRESSION:     No acute intracranial hemorrhage, mass effect, or evidenceof acute   vascular territorial infarction.    If clinical symptoms persist or worsen, more sensitive evaluation with   brain MRI may be obtained, if no contraindications exist.        < end of copied text >        Radiology: all available radiological tests reviewed    Advanced directives addressed: full resuscitation Patient is a 89y old  Male who presents with a chief complaint of Worsening confusion (16 Oct 2018 15:33)    HPI:  89 year old Man with pmhx of HTN, dementia, admitted on 10/16 for evaluation of worsening confusion, not able to recognize family members, intermittently agitated; patient sitting in chair, denies any specific complaints, however history per medical record.            PMH: as above  PSH: as above  Meds: per reconciliation sheet, noted below  MEDICATIONS  (STANDING):  amLODIPine   Tablet 10 milliGRAM(s) Oral daily  ATENolol  Tablet 50 milliGRAM(s) Oral daily  cefTRIAXone Injectable. 1000 milliGRAM(s) IV Push every 24 hours  enoxaparin Injectable 40 milliGRAM(s) SubCutaneous daily  QUEtiapine 12.5 milliGRAM(s) Oral daily  QUEtiapine 25 milliGRAM(s) Oral at bedtime  sodium chloride 0.9%. 1000 milliLiter(s) (100 mL/Hr) IV Continuous <Continuous>    MEDICATIONS  (PRN):  haloperidol    Injectable 2 milliGRAM(s) IntraMuscular every 4 hours PRN agitation    Allergies    No Known Allergies    Intolerances      Social: no smoking, no alcohol, no illegal drugs; no recent travel, no exposure to TB  FAMILY HISTORY:  No pertinent family history in first degree relatives     no history of premature cardiovascular disease in first degree relatives  ROS: unable to obtain secondary to patient medical condition     Vital Signs Last 24 Hrs  T(C): 36.8 (17 Oct 2018 06:01), Max: 36.8 (16 Oct 2018 19:25)  T(F): 98.2 (17 Oct 2018 06:01), Max: 98.2 (16 Oct 2018 19:25)  HR: 69 (17 Oct 2018 06:01) (54 - 71)  BP: 146/37 (17 Oct 2018 06:01) (126/66 - 146/37)  BP(mean): --  RR: 16 (16 Oct 2018 20:30) (16 - 18)  SpO2: 100% (17 Oct 2018 06:01) (98% - 100%)  Daily     Daily     PE:    Constitutional: frail looking  HEENT: NC/AT, EOMI, PERRLA, conjunctivae clear; ears and nose atraumatic; pharynx clear  Neck: supple; thyroid not palpable  Back: no tenderness  Respiratory: respiratory effort normal; clear to auscultation  Cardiovascular: S1S2 regular, no murmurs  Abdomen: soft, not tender, not distended, positive BS; no liver or spleen organomegaly  Genitourinary: no suprapubic tenderness  Musculoskeletal: no muscle tenderness, no joint swelling or tenderness  Neurological/ Psychiatric: confused; moving all extremities  Skin: no rashes; no palpable lesions    Labs: all available labs reviewed                        11.2   6.46  )-----------( 201      ( 16 Oct 2018 02:28 )             33.6     10-17    145  |  112<H>  |  26<H>  ----------------------------<  89  4.6   |  24  |  1.21    Ca    8.8      17 Oct 2018 05:44    TPro  6.6  /  Alb  3.6  /  TBili  0.5  /  DBili  x   /  AST  15  /  ALT  12  /  AlkPhos  50  10-16     LIVER FUNCTIONS - ( 16 Oct 2018 02:28 )  Alb: 3.6 g/dL / Pro: 6.6 gm/dL / ALK PHOS: 50 U/L / ALT: 12 U/L / AST: 15 U/L / GGT: x           Urinalysis Basic - ( 16 Oct 2018 15:21 )    Color: Yellow / Appearance: Clear / S.015 / pH: x  Gluc: x / Ketone: Negative  / Bili: Negative / Urobili: Negative mg/dL   Blood: x / Protein: 15 mg/dL / Nitrite: Negative   Leuk Esterase: Negative / RBC: >50 /HPF / WBC 6-10   Sq Epi: x / Non Sq Epi: Occasional / Bacteria: Occasional    < from: CT Head No Cont (10.16. @ 02:39) >    EXAM:  CT BRAIN                            PROCEDURE DATE:  10/16/2018          INTERPRETATION:  CLINICAL INFORMATION: Altered mental status.    COMPARISON: None available.    PROCEDURE:   Noncontrast CT of the Head was performed from the skull base to the   vertex. Coronal and Sagittal reformats were obtained.    FINDINGS:    There is no acute intracranial hemorrhage, mass effect, midline shift,   extra-axial collection, hydrocephalus, or evidence of acute vascular   territorial infarction. Mild patchy hypodensities within the   periventricular and subcortical white matter, although nonspecific,   likely reflect chronic microvascular disease. Cerebral volume loss   results in prominence of the ventricles and sulci. Intracranial   atherosclerosis is present.    The visualized paranasal sinuses and mastoid air cells are clear.   Intracranial contents are unremarkable. Visualized osseous structures are   intact.    IMPRESSION:     No acute intracranial hemorrhage, mass effect, or evidenceof acute   vascular territorial infarction.    If clinical symptoms persist or worsen, more sensitive evaluation with   brain MRI may be obtained, if no contraindications exist.        < end of copied text >        Radiology: all available radiological tests reviewed    Advanced directives addressed: DNR

## 2018-10-17 NOTE — CONSULT NOTE ADULT - ASSESSMENT
89 year old Man with pmhx of HTN, dementia, admitted on 10/16 for evaluation of worsening confusion, not able to recognize family members, intermittently agitated; patient sitting in chair, denies any specific complaints, however history per medical record.  1. Patient admitted with altered mental status; has mild pyuria, therefore will evaluate for urinary tract infection as possibly adding to patient confusion  - follow up cultures   - iv hydration and supportive care   - serial cbc and monitor temperature   - reviewed prior medical records to evaluate for resistant or atypical pathogens   - agree with ceftriaxone as ordered  2. other issues; hypertension, dementia  - per medicine

## 2018-10-17 NOTE — PROGRESS NOTE ADULT - ASSESSMENT
89y old Male coming from home with hx of Dementia (since 2008, FAST 5), HTN, admitted 10/16 for worsening confusion. As per wife and niece pt's confusion has worsened recently mostly confused at nights and less in the day time, does not recognize her or family members, but his confusion has worsened within the past 24 hours. Spouse and niece note that pt is ambulatory at baseline (without any device), able to dress, toilet, and feed self. Found here to have mild elevation in Cr (1.7), hyperkalemia (5.5), and CTH negative for acute pathology, pending urinary studies with 600cc on bedside bladder scan. Palliative Care consulted to assist with establishing GOC.     1) AMS/Dementia  - hx of dementia, FAST5-6, not end stage yet  - CTH ruling out any acute pathology  - likely sundowning  - possibly also contributed to by infection, awaiting UCx  - ID notes appreciated  - agree with seroquel 25mg qhs and 12.5mg qd prn, if needing further IV haldol doses would make 12.5mg dose standing as may have issues with placement if still needing 1:1 and IV sx management  - also agree with back up dose of haldol 2mg IM q4h prn    2) CORINA  - likely prerenal  - IVF  - encourage po intake, which wife says he usually drinks well  - c/w monitoring    3) Debility  - seems to have good physical function at home until today  - some evidence of muscle/fat wasting  - PT eval appreciated- RAIZA or LTC recommended    4) Prognosis  - likely overall poor  - progressive dementia, though not yet at end stage. Unclear though what new baseline will be. If does not rally or continues to decline would possibly then be a hospice candidate, not so at this time.     5) GOC/Advanced Directives  - pt does not have capacity for decision making  - no HCP, surrogate is wife: Vivienne: [phone]  - DNR and DNI. MOLST completed today: DNR, limited medical interventions, DNI, send to hospital, no feeding tube, trial of IVF, determine use of abx, no dialysis, ok for transfusions, no ICU, no pressors  - GOC meeting held 10/17 and completed today- family hoping for support in figuring out how best to support him after this, interested in RAIZA assessment and open to full discussion of all options. *Spent 30 minutes reviewing GOC with family including Advance care planning, explanation and discussion of advance directives, and completion of MOLST today  Thank you for including us in Mr. Salazar's care. Will continue to follow with you.    Codey Stubbs MD  Palliative Care Attending

## 2018-10-17 NOTE — PROGRESS NOTE ADULT - ASSESSMENT
AMS likely worsening Alzheimers' / Dementia with behavior symptoms vs metabolic encephalopathy w/ borderline K and CORINA  - pt with worsening AMS with past "few days" as per spouse & Niece-->"I saw him last week and he was fine"  - 1:1 at bedside reports pt has been trying to urinate but ''says its not coming"  - bladder scan--> straight cath if >200 & check UA  - start seroquel 12.5 mg am / 25 mg HS; haldol PRN   - safety precautions / 1:1  - monitor  - SW / case management    CORINA vs CORINA on CKD (crt 1.74)  - baseline crt unk  - spouse reports his po intake has been good until yesterday PM he "didn't eat anything"  - receck BMP    Essential HTN  - BP slightly above goal  - con't home meds  - managed agitation 89 year old Man with pmhx of HTN, dementia, CKD admitted for:     1. AMS likely  metabolic encephalopathy  due to UTI and Dehydration, baseline Alzheimers' dementia   - C/w  1:1 at bedside for safety  - fall precautions  - supportive care   - started on  seroquel 12.5 mg am  will change to PRN / 25 mg HS;    -PT   - SW / case management    2. CORINA /CKD, prerenal    - Unknown baseline  - CR improved with IVF, venkatesh continue at lower rate   - Monitor UO, c/w Jimenez for now plan to d/c Jimenze If UO stable   - receck BMP    3. Essential HTN  - BP  better   - con't home meds      4. DVT PPxs

## 2018-10-17 NOTE — PROGRESS NOTE ADULT - SUBJECTIVE AND OBJECTIVE BOX
CC: Worsening confusion (16 Oct 2018 15:33)    HPI:  89 year old Man with pmhx of HTN, dementia, BIBA accompanied by spouse for worsening confusion. As per wife and niece pt's confusion has worsened recently. Spouse reports pt is usually very confused at nights and less in the day time, does not recognize her or family members, but his confusion has worsened within the past 24 hours. Spouse and niece that pt is ambulatory at baseline, able to toilet self   and feed self.     In ED CT head negative for acute pathology, K 5.5, Crt 1.73, /56 (16 Oct 2018 13:26)    INTERVAL HPI/OVERNIGHT EVENTS:    Vital Signs Last 24 Hrs  T(C): 36.8 (17 Oct 2018 06:01), Max: 36.8 (16 Oct 2018 19:25)  T(F): 98.2 (17 Oct 2018 06:01), Max: 98.2 (16 Oct 2018 19:25)  HR: 69 (17 Oct 2018 06:01) (54 - 71)  BP: 146/37 (17 Oct 2018 06:01) (126/66 - 146/37)  BP(mean): --  RR: 16 (16 Oct 2018 20:30) (16 - 18)  SpO2: 100% (17 Oct 2018 06:01) (98% - 100%)  I&O's Detail    16 Oct 2018 07:01  -  17 Oct 2018 07:00  --------------------------------------------------------  IN:  Total IN: 0 mL    OUT:    Voided: 825 mL  Total OUT: 825 mL    Total NET: -825 mL        REVIEW OF SYSTEMS:    CONSTITUTIONAL: No weakness, fevers or chills  EYES/ENT: No visual changes;  No vertigo or throat pain   NECK: No pain or stiffness  RESPIRATORY: No cough, wheezing, hemoptysis; No shortness of breath  CARDIOVASCULAR: No chest pain or palpitations  GASTROINTESTINAL: No abdominal or epigastric pain. No nausea, vomiting, or hematemesis; No diarrhea or constipation. No melena or hematochezia.  GENITOURINARY: No dysuria, frequency or hematuria  NEUROLOGICAL: No numbness or weakness  SKIN: No itching, burning, rashes, or lesions   All other review of systems is negative unless indicated above.  PHYSICAL EXAM:    General: Well developed; well nourished; in no acute distress  Eyes: PERRLA, EOMI; conjunctiva and sclera clear  Head: Normocephalic; atraumatic  ENMT: No nasal discharge; airway clear  Neck: Supple; non tender; no masses  Respiratory: No wheezes, rales or rhonchi  Cardiovascular: Regular rate and rhythm. S1 and S2 Normal; No murmurs, gallops or rubs  Gastrointestinal: Soft non-tender non-distended; Normal bowel sounds  Genitourinary: No costovertebral angle tenderness  Extremities: Normal range of motion, No clubbing, cyanosis or edema  Vascular: Peripheral pulses palpable 2+ bilaterally  Neurological: Alert and oriented x4  Skin: Warm and dry. No acute rash  Lymph Nodes: No acute cervical adenopathy  Musculoskeletal: Normal gait, tone, without deformities  Psychiatric: Cooperative and appropriate                            11.2   6.46  )-----------( 201      ( 16 Oct 2018 02:28 )             33.6     17 Oct 2018 05:44    145    |  112    |  26     ----------------------------<  89     4.6     |  24     |  1.21     Ca    8.8        17 Oct 2018 05:44    TPro  6.6    /  Alb  3.6    /  TBili  0.5    /  DBili  x      /  AST  15     /  ALT  12     /  AlkPhos  50     16 Oct 2018 02:28      CAPILLARY BLOOD GLUCOSE        LIVER FUNCTIONS - ( 16 Oct 2018 02:28 )  Alb: 3.6 g/dL / Pro: 6.6 gm/dL / ALK PHOS: 50 U/L / ALT: 12 U/L / AST: 15 U/L / GGT: x           Urinalysis Basic - ( 16 Oct 2018 15:21 )    Color: Yellow / Appearance: Clear / S.015 / pH: x  Gluc: x / Ketone: Negative  / Bili: Negative / Urobili: Negative mg/dL   Blood: x / Protein: 15 mg/dL / Nitrite: Negative   Leuk Esterase: Negative / RBC: >50 /HPF / WBC 6-10   Sq Epi: x / Non Sq Epi: Occasional / Bacteria: Occasional        MEDICATIONS  (STANDING):  amLODIPine   Tablet 10 milliGRAM(s) Oral daily  ATENolol  Tablet 50 milliGRAM(s) Oral daily  cefTRIAXone Injectable. 1000 milliGRAM(s) IV Push every 24 hours  enoxaparin Injectable 40 milliGRAM(s) SubCutaneous daily  QUEtiapine 12.5 milliGRAM(s) Oral daily  QUEtiapine 25 milliGRAM(s) Oral at bedtime  sodium chloride 0.9%. 1000 milliLiter(s) (100 mL/Hr) IV Continuous <Continuous>    MEDICATIONS  (PRN):  haloperidol    Injectable 2 milliGRAM(s) IntraMuscular every 4 hours PRN agitation      RADIOLOGY & ADDITIONAL TESTS: CC: Worsening confusion (16 Oct 2018 15:33)    HPI:  89 year old Man with pmhx of HTN, dementia, BIBA accompanied by spouse for worsening confusion. As per wife and niece pt's confusion has worsened recently. Spouse reports pt is usually very confused at nights and less in the day time, does not recognize her or family members, but his confusion has worsened within the past 24 hours. Spouse and niece that pt is ambulatory at baseline, able to toilet self. Pt was c/o not being able to urinate and pain with urination    and feed self.     In ED CT head negative for acute pathology, K 5.5, Crt 1.73, /56. UA +     INTERVAL HPI/ OVERNIGHT EVENTS: Chart reviewed, Pt was seen and examined, Family at bedside, confirm history above. Now Pt is OOb, comfortable, Denies abd pain, no CP or diff breathing. As per family looks his normal.  Poor oral intake as per aid     Vital Signs Last 24 Hrs  T(C): 36.8 (17 Oct 2018 06:01), Max: 36.8 (16 Oct 2018 19:25)  T(F): 98.2 (17 Oct 2018 06:01), Max: 98.2 (16 Oct 2018 19:25)  HR: 69 (17 Oct 2018 06:01) (54 - 71)  BP: 146/37 (17 Oct 2018 06:01) (126/66 - 146/37)  RR: 16 (16 Oct 2018 20:30) (16 - 18)  SpO2: 100% (17 Oct 2018 06:01) (98% - 100%)        REVIEW OF SYSTEMS:  Unable top assess die to confusion     PHYSICAL EXAM:  General: Well developed;  in no acute distress  Eyes: PERRLA, EOMI; conjunctiva and sclera clear  Head: Normocephalic; atraumatic  ENMT: No nasal discharge; airway clear  Neck: Supple; noJVD  Respiratory: Good air entry b/l, No wheezes, rales or rhonchi  Cardiovascular: Regular rate and rhythm. S1 and S2 Normal; No murmurs  Gastrointestinal: Soft non-tender non-distended; Normal bowel sounds  Genitourinary: No costovertebral angle tenderness, no suprapubic pain   Extremities: Normal range of motion, No clubbing, cyanosis or edema  Vascular: Peripheral pulses palpable 2+ bilaterally  Neurological: Alert and oriented x1, non focal   Skin: Warm and dry. No acute rash  Lymph Nodes: No acute cervical adenopathy  Musculoskeletal: Normal gait, tone, without deformities  Psychiatric: Cooperative and appropriate    LABS:                         11.2   6.46  )-----------( 201      ( 16 Oct 2018 02:28 )             33.6     17 Oct 2018 05:44    145    |  112    |  26     ----------------------------<  89     4.6     |  24     |  1.21     Ca    8.8        17 Oct 2018 05:44    TPro  6.6    /  Alb  3.6    /  TBili  0.5    /  DBili  x      /  AST  15     /  ALT  12     /  AlkPhos  50     16 Oct 2018 02:28  LIVER FUNCTIONS - ( 16 Oct 2018 02:28 )  Alb: 3.6 g/dL / Pro: 6.6 gm/dL / ALK PHOS: 50 U/L / ALT: 12 U/L / AST: 15 U/L / GGT: x             Urinalysis Basic - ( 16 Oct 2018 15:21 )  Color: Yellow / Appearance: Clear / S.015 / pH: x  Gluc: x / Ketone: Negative  / Bili: Negative / Urobili: Negative mg/dL   Blood: x / Protein: 15 mg/dL / Nitrite: Negative   Leuk Esterase: Negative / RBC: >50 /HPF / WBC 6-10   Sq Epi: x / Non Sq Epi: Occasional / Bacteria: Occasional        MEDICATIONS  (STANDING):  amLODIPine   Tablet 10 milliGRAM(s) Oral daily  ATENolol  Tablet 50 milliGRAM(s) Oral daily  cefTRIAXone Injectable. 1000 milliGRAM(s) IV Push every 24 hours  enoxaparin Injectable 40 milliGRAM(s) SubCutaneous daily  QUEtiapine 12.5 milliGRAM(s) Oral daily  QUEtiapine 25 milliGRAM(s) Oral at bedtime  sodium chloride 0.9%. 1000 milliLiter(s) (100 mL/Hr) IV Continuous <Continuous>    MEDICATIONS  (PRN):  haloperidol    Injectable 2 milliGRAM(s) IntraMuscular every 4 hours PRN agitation      RADIOLOGY & ADDITIONAL TESTS:    EXAM:  CT BRAIN                        PROCEDURE DATE:  10/16/2018        FINDINGS:    There is no acute intracranial hemorrhage, mass effect, midline shift,   extra-axial collection, hydrocephalus, or evidence of acute vascular   territorial infarction. Mild patchy hypodensities within the   periventricular and subcortical white matter, although nonspecific,   likely reflect chronic microvascular disease. Cerebral volume loss   results in prominence of the ventricles and sulci. Intracranial   atherosclerosis is present.    The visualized paranasal sinuses and mastoid air cells are clear.   Intracranial contents are unremarkable. Visualized osseous structures are   intact.    IMPRESSION:     No acute intracranial hemorrhage, mass effect, or evidenceof acute   vascular territorial infarction.    If clinical symptoms persist or worsen, more sensitive evaluation with   brain MRI may be obtained, if no contraindications exist.

## 2018-10-17 NOTE — PHYSICAL THERAPY INITIAL EVALUATION ADULT - GENERAL OBSERVATIONS, REHAB EVAL
Pt rec'd sitting up in bedside chair with wife, niece and 1:1 present. pt confused, but cooperative, responding best to manual cues.

## 2018-10-18 LAB
ANION GAP SERPL CALC-SCNC: 11 MMOL/L — SIGNIFICANT CHANGE UP (ref 5–17)
BUN SERPL-MCNC: 33 MG/DL — HIGH (ref 7–23)
CALCIUM SERPL-MCNC: 8.7 MG/DL — SIGNIFICANT CHANGE UP (ref 8.5–10.1)
CHLORIDE SERPL-SCNC: 111 MMOL/L — HIGH (ref 96–108)
CO2 SERPL-SCNC: 22 MMOL/L — SIGNIFICANT CHANGE UP (ref 22–31)
CREAT SERPL-MCNC: 1.77 MG/DL — HIGH (ref 0.5–1.3)
FOLATE SERPL-MCNC: 6.5 NG/ML — SIGNIFICANT CHANGE UP
GLUCOSE SERPL-MCNC: 89 MG/DL — SIGNIFICANT CHANGE UP (ref 70–99)
HCT VFR BLD CALC: 34.2 % — LOW (ref 39–50)
HGB BLD-MCNC: 11.6 G/DL — LOW (ref 13–17)
MCHC RBC-ENTMCNC: 33.9 GM/DL — SIGNIFICANT CHANGE UP (ref 32–36)
MCHC RBC-ENTMCNC: 35 PG — HIGH (ref 27–34)
MCV RBC AUTO: 103.3 FL — HIGH (ref 80–100)
NRBC # BLD: 0 /100 WBCS — SIGNIFICANT CHANGE UP (ref 0–0)
PLATELET # BLD AUTO: 199 K/UL — SIGNIFICANT CHANGE UP (ref 150–400)
POTASSIUM SERPL-MCNC: 4.1 MMOL/L — SIGNIFICANT CHANGE UP (ref 3.5–5.3)
POTASSIUM SERPL-SCNC: 4.1 MMOL/L — SIGNIFICANT CHANGE UP (ref 3.5–5.3)
RBC # BLD: 3.31 M/UL — LOW (ref 4.2–5.8)
RBC # FLD: 13 % — SIGNIFICANT CHANGE UP (ref 10.3–14.5)
SODIUM SERPL-SCNC: 144 MMOL/L — SIGNIFICANT CHANGE UP (ref 135–145)
VIT B12 SERPL-MCNC: <150 PG/ML — LOW (ref 232–1245)
WBC # BLD: 7.27 K/UL — SIGNIFICANT CHANGE UP (ref 3.8–10.5)
WBC # FLD AUTO: 7.27 K/UL — SIGNIFICANT CHANGE UP (ref 3.8–10.5)

## 2018-10-18 RX ORDER — HEPARIN SODIUM 5000 [USP'U]/ML
5000 INJECTION INTRAVENOUS; SUBCUTANEOUS EVERY 12 HOURS
Qty: 0 | Refills: 0 | Status: DISCONTINUED | OUTPATIENT
Start: 2018-10-18 | End: 2018-10-23

## 2018-10-18 RX ORDER — TAMSULOSIN HYDROCHLORIDE 0.4 MG/1
0.4 CAPSULE ORAL AT BEDTIME
Qty: 0 | Refills: 0 | Status: DISCONTINUED | OUTPATIENT
Start: 2018-10-18 | End: 2018-10-23

## 2018-10-18 RX ORDER — QUETIAPINE FUMARATE 200 MG/1
50 TABLET, FILM COATED ORAL AT BEDTIME
Qty: 0 | Refills: 0 | Status: DISCONTINUED | OUTPATIENT
Start: 2018-10-18 | End: 2018-10-23

## 2018-10-18 RX ADMIN — HEPARIN SODIUM 5000 UNIT(S): 5000 INJECTION INTRAVENOUS; SUBCUTANEOUS at 18:03

## 2018-10-18 RX ADMIN — HALOPERIDOL DECANOATE 2 MILLIGRAM(S): 100 INJECTION INTRAMUSCULAR at 21:24

## 2018-10-18 RX ADMIN — QUETIAPINE FUMARATE 12.5 MILLIGRAM(S): 200 TABLET, FILM COATED ORAL at 11:42

## 2018-10-18 RX ADMIN — HALOPERIDOL DECANOATE 2 MILLIGRAM(S): 100 INJECTION INTRAMUSCULAR at 05:03

## 2018-10-18 NOTE — PROGRESS NOTE ADULT - ASSESSMENT
89 year old Man with pmhx of HTN, dementia, admitted on 10/16 for evaluation of worsening confusion, not able to recognize family members, intermittently agitated; patient sitting in chair, denies any specific complaints, however history per medical record.  1. Patient admitted with altered mental status; has mild pyuria, therefore will evaluate for urinary tract infection as possibly adding to patient confusion  - follow up cultures   - iv hydration and supportive care   - serial cbc and monitor temperature   - reviewed prior medical records to evaluate for resistant or atypical pathogens   - given lack of uti will stop ceftriaxone and observe off antibiotics  If further ID issues please reconsult   2. other issues; hypertension, dementia  - per medicine

## 2018-10-18 NOTE — PROGRESS NOTE ADULT - SUBJECTIVE AND OBJECTIVE BOX
HPI: Pt seen and examined this am in follow up for sx, 1:1 at bedside. Pt calm and sitting up at edge of bed. Attempting to make conversation, though pleasantly confused. Pt unable to orient further than himself, thinking he was in [place] and that it was 1945. He denies pain or discomfort. Overnight pt needed 2 IM doses of haldol (each dose 2mg) in addition to 25mg qhs dose of seroquel.       PAIN: denies  DYSPNEA: denies      ROS:    Limited by confusion      PHYSICAL EXAM:    Vital Signs Last 24 Hrs  T(C): 36.2 (17 Oct 2018 16:51), Max: 36.3 (17 Oct 2018 10:50)  T(F): 97.2 (17 Oct 2018 16:51), Max: 97.4 (17 Oct 2018 10:50)  HR: 98 (18 Oct 2018 05:22) (55 - 98)  BP: 165/94 (18 Oct 2018 05:22) (92/40 - 165/94)  BP(mean): --  RR: 20 (18 Oct 2018 05:22) (18 - 20)  SpO2: 100% (18 Oct 2018 05:22) (100% - 100%)  Daily     Daily     PPSV2: 30  %    General: Elderly male sitting up at edge of bed legs crossed, smiling, cooperative when redirected follows instructions, NAD  Mental Status: AOx1 (self only)  HEENT: dmm, perrl, eomi, some temporal wasting  Lungs: clear  Cardiac: +s1 s2 rrr  GI: soft nt nd +bs  : beaulieu in place draining yellow urine  Ext: moves all extremities, no edema  Neuro: no focal deficits outside of confusion    LABS:                        11.6   7.27  )-----------( 199      ( 18 Oct 2018 05:58 )             34.2     10-18    144  |  111<H>  |  33<H>  ----------------------------<  89  4.1   |  22  |  1.77<H>    Ca    8.7      18 Oct 2018 05:58    Culture - Urine (10.16.18 @ 15:21)    Specimen Source: .Urine None    Culture Results:   No growth      Albumin: Albumin, Serum: 3.6 g/dL (10-16 @ 02:28)      Allergies    No Known Allergies    Intolerances      MEDICATIONS  (STANDING):  amLODIPine   Tablet 10 milliGRAM(s) Oral daily  ATENolol  Tablet 50 milliGRAM(s) Oral daily  enoxaparin Injectable 40 milliGRAM(s) SubCutaneous daily  QUEtiapine 50 milliGRAM(s) Oral at bedtime  sodium chloride 0.9%. 1000 milliLiter(s) (50 mL/Hr) IV Continuous <Continuous>    MEDICATIONS  (PRN):  haloperidol    Injectable 2 milliGRAM(s) IntraMuscular every 4 hours PRN agitation  QUEtiapine 12.5 milliGRAM(s) Oral daily PRN agitation

## 2018-10-18 NOTE — PROGRESS NOTE ADULT - ASSESSMENT
89 year old Man with pmhx of HTN, dementia, CKD admitted for:     1. AMS likely  metabolic encephalopathy  due to UTI and Dehydration, baseline Alzheimers' dementia   - C/w  1:1 at bedside for safety  - fall precautions  - supportive care   - started on  seroquel 12.5 mg am  will change to PRN / 25 mg HS;    -PT   - SW / case management    2. CORINA /CKD, prerenal    - Unknown baseline  - CR improved with IVF, venkatesh continue at lower rate   - Monitor UO, c/w Jimenez for now plan to d/c Jimenez If UO stable   - receck BMP    3. Essential HTN  - BP  better   - con't home meds      4. DVT PPxs 89 year old Man with pmhx of HTN, dementia, CKD admitted for:     1. AMS likely  metabolic encephalopathy  due to UTI and Dehydration, baseline Alzheimers' dementia   - C/w  1:1 at bedside for safety  - fall precautions  - supportive care   - started on  seroquel 12.5 mg am PRN, increased seroquel to 50mg QHS standing     -PT   - SW    2. CORINA /CKD stage 3 prerenal    - CR improved with IVF, now likely at baseline   -  UO good   - D/c Jimenez, voiding trial   - receck BMP    3. Essential HTN  - BP  better   - con't home meds      4. DVT PPxs   Dispo: d/c planning to [facility] in am if medically stable and bed available

## 2018-10-18 NOTE — PROGRESS NOTE ADULT - SUBJECTIVE AND OBJECTIVE BOX
Patient is a 89y old  Male who presents with a chief complaint of Worsening confusion (16 Oct 2018 15:33)    Date of service: 10-18-18 @ 12:43      Patient confused  Ambulating with physical therapy      ROS unable to obtain secondary to patient medical condition     MEDICATIONS  (STANDING):  amLODIPine   Tablet 10 milliGRAM(s) Oral daily  ATENolol  Tablet 50 milliGRAM(s) Oral daily  heparin  Injectable 5000 Unit(s) SubCutaneous every 12 hours  QUEtiapine 50 milliGRAM(s) Oral at bedtime  sodium chloride 0.9%. 1000 milliLiter(s) (50 mL/Hr) IV Continuous <Continuous>  tamsulosin 0.4 milliGRAM(s) Oral at bedtime    MEDICATIONS  (PRN):  haloperidol    Injectable 2 milliGRAM(s) IntraMuscular every 4 hours PRN agitation  QUEtiapine 12.5 milliGRAM(s) Oral daily PRN agitation      Vital Signs Last 24 Hrs  T(C): 36.4 (18 Oct 2018 10:50), Max: 36.4 (18 Oct 2018 10:50)  T(F): 97.5 (18 Oct 2018 10:50), Max: 97.5 (18 Oct 2018 10:50)  HR: 54 (18 Oct 2018 10:50) (54 - 98)  BP: 100/48 (18 Oct 2018 10:50) (100/48 - 165/94)  BP(mean): --  RR: 18 (18 Oct 2018 10:50) (18 - 20)  SpO2: 100% (18 Oct 2018 10:50) (100% - 100%)    Physical Exam:    PE:    Constitutional: frail looking  HEENT: NC/AT, EOMI, PERRLA, conjunctivae clear; ears and nose atraumatic; pharynx clear  Neck: supple; thyroid not palpable  Back: no tenderness  Respiratory: respiratory effort normal; clear to auscultation  Cardiovascular: S1S2 regular, no murmurs  Abdomen: soft, not tender, not distended, positive BS; no liver or spleen organomegaly  Genitourinary: no suprapubic tenderness  Musculoskeletal: no muscle tenderness, no joint swelling or tenderness  Neurological/ Psychiatric: confused; moving all extremities  Skin: no rashes; no palpable lesions    Labs: all available labs reviewed                        Labs:                        11.6   7.27  )-----------( 199      ( 18 Oct 2018 05:58 )             34.2     10-18    144  |  111<H>  |  33<H>  ----------------------------<  89  4.1   |  22  |  1.77<H>    Ca    8.7      18 Oct 2018 05:58             Cultures:       Culture - Urine (collected 10-16-18 @ 15:21)  Source: .Urine None  Final Report (10-17-18 @ 22:08):    No growth            < from: CT Head No Cont (10.16.18 @ 02:39) >    EXAM:  CT BRAIN                            PROCEDURE DATE:  10/16/2018          INTERPRETATION:  CLINICAL INFORMATION: Altered mental status.    COMPARISON: None available.    PROCEDURE:   Noncontrast CT of the Head was performed from the skull base to the   vertex. Coronal and Sagittal reformats were obtained.    FINDINGS:    There is no acute intracranial hemorrhage, mass effect, midline shift,   extra-axial collection, hydrocephalus, or evidence of acute vascular   territorial infarction. Mild patchy hypodensities within the   periventricular and subcortical white matter, although nonspecific,   likely reflect chronic microvascular disease. Cerebral volume loss   results in prominence of the ventricles and sulci. Intracranial   atherosclerosis is present.    The visualized paranasal sinuses and mastoid air cells are clear.   Intracranial contents are unremarkable. Visualized osseous structures are   intact.    IMPRESSION:     No acute intracranial hemorrhage, mass effect, or evidenceof acute   vascular territorial infarction.    If clinical symptoms persist or worsen, more sensitive evaluation with   brain MRI may be obtained, if no contraindications exist.        < end of copied text >        Radiology: all available radiological tests reviewed    Advanced directives addressed: DNR

## 2018-10-18 NOTE — STUDENT SIGN OFF DOCUMENT - DOCUMENTS STUDENTS ARE SIGNED OFF ON
Vital Signs/Plan of Care/Restraint Level 1/Assessment and Intervention/Constant Observation Nursing Worklist/Provider Contact Note

## 2018-10-18 NOTE — PROGRESS NOTE ADULT - ASSESSMENT
89y old Male coming from home with hx of Dementia (since 2008, FAST 5), HTN, admitted 10/16 for worsening confusion. As per wife and niece pt's confusion has worsened recently mostly confused at nights and less in the day time, does not recognize her or family members, but his confusion has worsened within the past 24 hours. Spouse and niece note that pt is ambulatory at baseline (without any device), able to dress, toilet, and feed self. Found here to have mild elevation in Cr (1.7), hyperkalemia (5.5), and CTH negative for acute pathology, pending urinary studies with 600cc on bedside bladder scan. Palliative Care consulted to assist with establishing GOC.     1) AMS/Dementia  - hx of dementia, FAST5-6, not end stage yet  - CTH ruling out any acute pathology  - likely sundowning  - now less likely contributed to by infection, UCx negative thus far  - given pt still requiring 1:1 and at least 2 doses IM haldol overnight will increase QHS seroquel dose to 50mg qhs and keep 12.5mg qd prn, as may have issues with placement if still needing 1:1 and IV sx management  - d/w Dr. Mckeon who agrees with above  - in such a case pt may also do better with being moved into ansari periodically or to window room to help with day/night regulation   - frequent reorientation  - c/w with back up dose of haldol 2mg IM q4h prn    2) ?Infection  - ID notes appreciated  - UA mildly positive but no other signs of infection  - UCx negative  - awaiting decision cc: further abx    3) CORINA  - likely prerenal  - IVF  - encourage po intake, which wife says he usually drinks well  - c/w monitoring    4) Debility  - seems to have good physical function at home until today  - some evidence of muscle/fat wasting  - PT eval appreciated- [facility] or [facility]TC recommended    5) Prognosis  - likely overall poor  - progressive dementia, though not yet at end stage. Unclear though what new baseline will be. If does not rally or continues to decline would possibly then be a hospice candidate, not so at this time.     6) GOC/Advanced Directives  - pt does not have capacity for decision making  - no HCP, surrogate is wife: Vivienne: [phone]  - DNR and DNI. MOLST completed: DNR, limited medical interventions, DNI, send to hospital, no feeding tube, trial of IVF, determine use of abx, no dialysis, ok for transfusions, no ICU, no pressors  - [facility] meeting held 10/17 and completed 10/18- family hoping for support in figuring out how best to support him after this, interested in [facility] assessment and open to full discussion of all options. Floor SW to work with pt's family cc: placement    Thank you for including us in Mr. Salazar's care. Will continue to follow with you.    Codey Stubbs MD  Palliative Care Attending

## 2018-10-18 NOTE — PROGRESS NOTE ADULT - SUBJECTIVE AND OBJECTIVE BOX
CC: Worsening confusion (16 Oct 2018 15:33)    HPI:  89 year old Man with pmhx of HTN, dementia, BIBA accompanied by spouse for worsening confusion. As per wife and niece pt's confusion has worsened recently. Spouse reports pt is usually very confused at nights and less in the day time, does not recognize her or family members, but his confusion has worsened within the past 24 hours. Spouse and niece that pt is ambulatory at baseline, able to toilet self. Pt was c/o not being able to urinate and pain with urination    and feed self.     In ED CT head negative for acute pathology, K 5.5, Crt 1.73, /56. UA +     INTERVAL HPI/ OVERNIGHT EVENTS: Chart reviewed, Pt was seen and examined, Family at bedside, confirm history above. Now Pt is OOb, comfortable, Denies abd pain, no CP or diff breathing. As per family looks his normal.  Poor oral intake as per aid     Vital Signs Last 24 Hrs  T(C): 36.4 (18 Oct 2018 10:50), Max: 36.4 (18 Oct 2018 10:50)  T(F): 97.5 (18 Oct 2018 10:50), Max: 97.5 (18 Oct 2018 10:50)  HR: 54 (18 Oct 2018 10:50) (54 - 98)  BP: 100/48 (18 Oct 2018 10:50) (100/48 - 165/94)  RR: 18 (18 Oct 2018 10:50) (18 - 20)  SpO2: 100% (18 Oct 2018 10:50) (100% - 100%)    REVIEW OF SYSTEMS:  Unable top assess die to confusion     PHYSICAL EXAM:  General: Well developed;  in no acute distress  Eyes: PERRLA, EOMI; conjunctiva and sclera clear  Head: Normocephalic; atraumatic  ENMT: No nasal discharge; airway clear  Neck: Supple; noJVD  Respiratory: Good air entry b/l, No wheezes, rales or rhonchi  Cardiovascular: Regular rate and rhythm. S1 and S2 Normal; No murmurs  Gastrointestinal: Soft non-tender non-distended; Normal bowel sounds  Genitourinary: No costovertebral angle tenderness, no suprapubic pain   Extremities: Normal range of motion, No clubbing, cyanosis or edema  Vascular: Peripheral pulses palpable 2+ bilaterally  Neurological: Alert and oriented x1, non focal   Skin: Warm and dry. No acute rash  Lymph Nodes: No acute cervical adenopathy  Musculoskeletal: Normal gait, tone, without deformities  Psychiatric: Cooperative and appropriate    LABS:                           11.6   7.27  )-----------( 199      ( 18 Oct 2018 05:58 )             34.2     10-18    144  |  111<H>  |  33<H>  ----------------------------<  89  4.1   |  22  |  1.77<H>    Ca    8.7      18 Oct 2018 05:58                              11.2   6.46  )-----------( 201      ( 16 Oct 2018 02:28 )             33.6     17 Oct 2018 05:44    145    |  112    |  26     ----------------------------<  89     4.6     |  24     |  1.21     Ca    8.8        17 Oct 2018 05:44    TPro  6.6    /  Alb  3.6    /  TBili  0.5    /  DBili  x      /  AST  15     /  ALT  12     /  AlkPhos  50     16 Oct 2018 02:28  LIVER FUNCTIONS - ( 16 Oct 2018 02:28 )  Alb: 3.6 g/dL / Pro: 6.6 gm/dL / ALK PHOS: 50 U/L / ALT: 12 U/L / AST: 15 U/L / GGT: x             Urinalysis Basic - ( 16 Oct 2018 15:21 )  Color: Yellow / Appearance: Clear / S.015 / pH: x  Gluc: x / Ketone: Negative  / Bili: Negative / Urobili: Negative mg/dL   Blood: x / Protein: 15 mg/dL / Nitrite: Negative   Leuk Esterase: Negative / RBC: >50 /HPF / WBC 6-10   Sq Epi: x / Non Sq Epi: Occasional / Bacteria: Occasional    Culture - Urine (10.16.18 @ 15:21)    Specimen Source: .Urine None    Culture Results:   No growth        MEDICATIONS  (STANDING):  amLODIPine   Tablet 10 milliGRAM(s) Oral daily  ATENolol  Tablet 50 milliGRAM(s) Oral daily  cefTRIAXone Injectable. 1000 milliGRAM(s) IV Push every 24 hours  enoxaparin Injectable 40 milliGRAM(s) SubCutaneous daily  QUEtiapine 12.5 milliGRAM(s) Oral daily  QUEtiapine 25 milliGRAM(s) Oral at bedtime  sodium chloride 0.9%. 1000 milliLiter(s) (100 mL/Hr) IV Continuous <Continuous>    MEDICATIONS  (PRN):  haloperidol    Injectable 2 milliGRAM(s) IntraMuscular every 4 hours PRN agitation      RADIOLOGY & ADDITIONAL TESTS:    EXAM:  CT BRAIN                        PROCEDURE DATE:  10/16/2018        FINDINGS:    There is no acute intracranial hemorrhage, mass effect, midline shift,   extra-axial collection, hydrocephalus, or evidence of acute vascular   territorial infarction. Mild patchy hypodensities within the   periventricular and subcortical white matter, although nonspecific,   likely reflect chronic microvascular disease. Cerebral volume loss   results in prominence of the ventricles and sulci. Intracranial   atherosclerosis is present.    The visualized paranasal sinuses and mastoid air cells are clear.   Intracranial contents are unremarkable. Visualized osseous structures are   intact.    IMPRESSION:     No acute intracranial hemorrhage, mass effect, or evidenceof acute   vascular territorial infarction.    If clinical symptoms persist or worsen, more sensitive evaluation with   brain MRI may be obtained, if no contraindications exist. CC: Worsening confusion (16 Oct 2018 15:33)    HPI:  89 year old Man with pmhx of HTN, dementia, BIBA accompanied by spouse for worsening confusion. As per wife and niece pt's confusion has worsened recently. Spouse reports pt is usually very confused at nights and less in the day time, does not recognize her or family members, but his confusion has worsened within the past 24 hours. Spouse and niece that pt is ambulatory at baseline, able to toilet self. Pt was c/o not being able to urinate and pain with urination    and feed self.     In ED CT head negative for acute pathology, K 5.5, Crt 1.73, /56. UA +     INTERVAL HPI/ OVERNIGHT EVENTS: , Pt was seen and examined, Family at bedside,  was agitated last night as per RN report, now Pt is OOb, comfortable, Denies abd pain, no CP or diff breathing. POC discussed. Will d/c beaulieu, voiding trial         Vital Signs Last 24 Hrs  T(C): 36.4 (18 Oct 2018 10:50), Max: 36.4 (18 Oct 2018 10:50)  T(F): 97.5 (18 Oct 2018 10:50), Max: 97.5 (18 Oct 2018 10:50)  HR: 54 (18 Oct 2018 10:50) (54 - 98)  BP: 100/48 (18 Oct 2018 10:50) (100/48 - 165/94)  RR: 18 (18 Oct 2018 10:50) (18 - 20)  SpO2: 100% (18 Oct 2018 10:50) (100% - 100%)    REVIEW OF SYSTEMS:  Unable top assess die to confusion     PHYSICAL EXAM:  General: Well developed;  in no acute distress  Eyes: PERRLA, EOMI; conjunctiva and sclera clear  Head: Normocephalic; atraumatic  ENMT: No nasal discharge; airway clear  Neck: Supple; noJVD  Respiratory: Good air entry b/l, No wheezes, rales or rhonchi  Cardiovascular: Regular rate and rhythm. S1 and S2 Normal; No murmurs  Gastrointestinal: Soft non-tender non-distended; Normal bowel sounds  Genitourinary: No costovertebral angle tenderness, no suprapubic pain, Beaulieu in place   Extremities: Normal range of motion, No clubbing, cyanosis or edema  Vascular: Peripheral pulses palpable 2+ bilaterally  Neurological: Alert and oriented x1, non focal   Skin: Warm and dry. No acute rash  Lymph Nodes: No acute cervical adenopathy  Musculoskeletal: Normal gait, tone, without deformities  Psychiatric: Cooperative and appropriate    LABS:                           11.6   7.27  )-----------( 199      ( 18 Oct 2018 05:58 )             34.2     10-18    144  |  111<H>  |  33<H>  ----------------------------<  89  4.1   |  22  |  1.77<H>    Ca    8.7      18 Oct 2018 05:58                              11.2   6.46  )-----------( 201      ( 16 Oct 2018 02:28 )             33.6     17 Oct 2018 05:44    145    |  112    |  26     ----------------------------<  89     4.6     |  24     |  1.21     Ca    8.8        17 Oct 2018 05:44    TPro  6.6    /  Alb  3.6    /  TBili  0.5    /  DBili  x      /  AST  15     /  ALT  12     /  AlkPhos  50     16 Oct 2018 02:28  LIVER FUNCTIONS - ( 16 Oct 2018 02:28 )  Alb: 3.6 g/dL / Pro: 6.6 gm/dL / ALK PHOS: 50 U/L / ALT: 12 U/L / AST: 15 U/L / GGT: x             Urinalysis Basic - ( 16 Oct 2018 15:21 )  Color: Yellow / Appearance: Clear / S.015 / pH: x  Gluc: x / Ketone: Negative  / Bili: Negative / Urobili: Negative mg/dL   Blood: x / Protein: 15 mg/dL / Nitrite: Negative   Leuk Esterase: Negative / RBC: >50 /HPF / WBC 6-10   Sq Epi: x / Non Sq Epi: Occasional / Bacteria: Occasional    Culture - Urine (10.16.18 @ 15:21)    Specimen Source: .Urine None    Culture Results:   No growth        MEDICATIONS  (STANDING):  amLODIPine   Tablet 10 milliGRAM(s) Oral daily  ATENolol  Tablet 50 milliGRAM(s) Oral daily  enoxaparin Injectable 40 milliGRAM(s) SubCutaneous daily  QUEtiapine 50 milliGRAM(s) Oral at bedtime  sodium chloride 0.9%. 1000 milliLiter(s) (50 mL/Hr) IV Continuous <Continuous>  tamsulosin 0.4 milliGRAM(s) Oral at bedtime    MEDICATIONS  (PRN):  haloperidol    Injectable 2 milliGRAM(s) IntraMuscular every 4 hours PRN agitation  QUEtiapine 12.5 milliGRAM(s) Oral daily PRN agitation      RADIOLOGY & ADDITIONAL TESTS:    EXAM:  CT BRAIN                        PROCEDURE DATE:  10/16/2018        FINDINGS:    There is no acute intracranial hemorrhage, mass effect, midline shift,   extra-axial collection, hydrocephalus, or evidence of acute vascular   territorial infarction. Mild patchy hypodensities within the   periventricular and subcortical white matter, although nonspecific,   likely reflect chronic microvascular disease. Cerebral volume loss   results in prominence of the ventricles and sulci. Intracranial   atherosclerosis is present.    The visualized paranasal sinuses and mastoid air cells are clear.   Intracranial contents are unremarkable. Visualized osseous structures are   intact.    IMPRESSION:     No acute intracranial hemorrhage, mass effect, or evidenceof acute   vascular territorial infarction.    If clinical symptoms persist or worsen, more sensitive evaluation with   brain MRI may be obtained, if no contraindications exist.

## 2018-10-18 NOTE — PROVIDER CONTACT NOTE (OTHER) - SITUATION
Pt. still agitated and restless, another dose of IM Haldol 2mg given. bp 165/94 HR 98. Refusing PO meds

## 2018-10-19 LAB
ANION GAP SERPL CALC-SCNC: 9 MMOL/L — SIGNIFICANT CHANGE UP (ref 5–17)
BUN SERPL-MCNC: 34 MG/DL — HIGH (ref 7–23)
CALCIUM SERPL-MCNC: 8.6 MG/DL — SIGNIFICANT CHANGE UP (ref 8.5–10.1)
CHLORIDE SERPL-SCNC: 112 MMOL/L — HIGH (ref 96–108)
CO2 SERPL-SCNC: 22 MMOL/L — SIGNIFICANT CHANGE UP (ref 22–31)
CREAT SERPL-MCNC: 1.44 MG/DL — HIGH (ref 0.5–1.3)
GLUCOSE SERPL-MCNC: 86 MG/DL — SIGNIFICANT CHANGE UP (ref 70–99)
POTASSIUM SERPL-MCNC: 4 MMOL/L — SIGNIFICANT CHANGE UP (ref 3.5–5.3)
POTASSIUM SERPL-SCNC: 4 MMOL/L — SIGNIFICANT CHANGE UP (ref 3.5–5.3)
SODIUM SERPL-SCNC: 143 MMOL/L — SIGNIFICANT CHANGE UP (ref 135–145)

## 2018-10-19 RX ORDER — FINASTERIDE 5 MG/1
5 TABLET, FILM COATED ORAL DAILY
Qty: 0 | Refills: 0 | Status: DISCONTINUED | OUTPATIENT
Start: 2018-10-19 | End: 2018-10-23

## 2018-10-19 RX ADMIN — TAMSULOSIN HYDROCHLORIDE 0.4 MILLIGRAM(S): 0.4 CAPSULE ORAL at 21:59

## 2018-10-19 RX ADMIN — QUETIAPINE FUMARATE 50 MILLIGRAM(S): 200 TABLET, FILM COATED ORAL at 21:59

## 2018-10-19 RX ADMIN — HEPARIN SODIUM 5000 UNIT(S): 5000 INJECTION INTRAVENOUS; SUBCUTANEOUS at 17:44

## 2018-10-19 RX ADMIN — HEPARIN SODIUM 5000 UNIT(S): 5000 INJECTION INTRAVENOUS; SUBCUTANEOUS at 05:38

## 2018-10-19 RX ADMIN — AMLODIPINE BESYLATE 10 MILLIGRAM(S): 2.5 TABLET ORAL at 16:25

## 2018-10-19 RX ADMIN — ATENOLOL 50 MILLIGRAM(S): 25 TABLET ORAL at 16:25

## 2018-10-19 NOTE — PROGRESS NOTE ADULT - SUBJECTIVE AND OBJECTIVE BOX
CC: Worsening confusion (16 Oct 2018 15:33)    HPI:  89 year old Man with pmhx of HTN, dementia, BIBA accompanied by spouse for worsening confusion. As per wife and niece pt's confusion has worsened recently. Spouse reports pt is usually very confused at nights and less in the day time, does not recognize her or family members, but his confusion has worsened within the past 24 hours. Spouse and niece that pt is ambulatory at baseline, able to toilet self. Pt was c/o not being able to urinate and pain with urination    and feed self.     In ED CT head negative for acute pathology, K 5.5, Crt 1.73, /56. UA +     INTERVAL HPI/ OVERNIGHT EVENTS: , Pt was seen and examined, Family at bedside,  was agitated last night  bladder scan in am showed retention, Jimenez placed. Pt is comfortable in bed, no complains. Discussed d/c planning with Family Plan for [facility]    Vital Signs Last 24 Hrs  T(C): 36.9 (19 Oct 2018 17:41), Max: 37.1 (19 Oct 2018 10:38)  T(F): 98.5 (19 Oct 2018 17:41), Max: 98.8 (19 Oct 2018 10:38)  HR: 61 (19 Oct 2018 17:41) (61 - 75)  BP: 144/47 (19 Oct 2018 17:41) (123/42 - 154/60)  RR: 18 (19 Oct 2018 17:41) (18 - 20)  SpO2: 98% (19 Oct 2018 17:41) (98% - 100%)      REVIEW OF SYSTEMS:  Unable top assess due  to confusion     PHYSICAL EXAM:  General: Well developed;  in no acute distress  Eyes: PERRLA, EOMI; conjunctiva and sclera clear  Head: Normocephalic; atraumatic  ENMT: No nasal discharge; airway clear  Neck: Supple; noJVD  Respiratory: Good air entry b/l, No wheezes, rales or rhonchi  Cardiovascular: Regular rate and rhythm. S1 and S2 Normal; No murmurs  Gastrointestinal: Soft non-tender non-distended; Normal bowel sounds  Genitourinary: No costovertebral angle tenderness, no suprapubic pain, Jimenez in place   Extremities: Normal range of motion, No clubbing, cyanosis or edema  Vascular: Peripheral pulses palpable 2+ bilaterally  Neurological: Alert and oriented x1, non focal   Skin: Warm and dry. No acute rash  Lymph Nodes: No acute cervical adenopathy  Musculoskeletal: Normal gait, tone, without deformities  Psychiatric: Cooperative and appropriate    LABS:                                        11.6   7.27  )-----------( 199      ( 18 Oct 2018 05:58 )             34.2     10-18    144  |  111<H>  |  33<H>  ----------------------------<  89  4.1   |  22  |  1.77<H>    Ca    8.7      18 Oct 2018 05:58                              11.2   6.46  )-----------( 201      ( 16 Oct 2018 02:28 )             33.6     17 Oct 2018 05:44    145    |  112    |  26     ----------------------------<  89     4.6     |  24     |  1.21     Ca    8.8        17 Oct 2018 05:44    TPro  6.6    /  Alb  3.6    /  TBili  0.5    /  DBili  x      /  AST  15     /  ALT  12     /  AlkPhos  50     16 Oct 2018 02:28  LIVER FUNCTIONS - ( 16 Oct 2018 02:28 )  Alb: 3.6 g/dL / Pro: 6.6 gm/dL / ALK PHOS: 50 U/L / ALT: 12 U/L / AST: 15 U/L / GGT: x             Urinalysis Basic - ( 16 Oct 2018 15:21 )  Color: Yellow / Appearance: Clear / S.015 / pH: x  Gluc: x / Ketone: Negative  / Bili: Negative / Urobili: Negative mg/dL   Blood: x / Protein: 15 mg/dL / Nitrite: Negative   Leuk Esterase: Negative / RBC: >50 /HPF / WBC 6-10   Sq Epi: x / Non Sq Epi: Occasional / Bacteria: Occasional    Culture - Urine (10.16.18 @ 15:21)    Specimen Source: .Urine None    Culture Results:   No growth        MEDICATIONS  (STANDING):  amLODIPine   Tablet 10 milliGRAM(s) Oral daily  ATENolol  Tablet 50 milliGRAM(s) Oral daily  enoxaparin Injectable 40 milliGRAM(s) SubCutaneous daily  QUEtiapine 50 milliGRAM(s) Oral at bedtime  sodium chloride 0.9%. 1000 milliLiter(s) (50 mL/Hr) IV Continuous <Continuous>  tamsulosin 0.4 milliGRAM(s) Oral at bedtime    MEDICATIONS  (PRN):  haloperidol    Injectable 2 milliGRAM(s) IntraMuscular every 4 hours PRN agitation  QUEtiapine 12.5 milliGRAM(s) Oral daily PRN agitation      RADIOLOGY & ADDITIONAL TESTS:    EXAM:  CT BRAIN                        PROCEDURE DATE:  10/16/2018        FINDINGS:    There is no acute intracranial hemorrhage, mass effect, midline shift,   extra-axial collection, hydrocephalus, or evidence of acute vascular   territorial infarction. Mild patchy hypodensities within the   periventricular and subcortical white matter, although nonspecific,   likely reflect chronic microvascular disease. Cerebral volume loss   results in prominence of the ventricles and sulci. Intracranial   atherosclerosis is present.    The visualized paranasal sinuses and mastoid air cells are clear.   Intracranial contents are unremarkable. Visualized osseous structures are   intact.    IMPRESSION:     No acute intracranial hemorrhage, mass effect, or evidenceof acute   vascular territorial infarction.    If clinical symptoms persist or worsen, more sensitive evaluation with   brain MRI may be obtained, if no contraindications exist.

## 2018-10-19 NOTE — PROGRESS NOTE ADULT - ASSESSMENT
89 year old Man with pmhx of HTN, dementia, CKD admitted for:     1. AMS likely  metabolic encephalopathy  due to Urinary retention and Dehydration, baseline Alzheimers' dementia   - C/w  1:1 at bedside for safety  - fall precautions  - supportive care   - started on  seroquel 12.5 mg am PRN, c/w seroquel to 50mg QHS standing     -PT   - SW    2. CORINA /CKD stage 3 prerenal    - CR improved with IVF, now likely at baseline   -  UO good   - C/w  Beaulieu, failed  voiding trial   - receck BMP in few days     3. Essential HTN  - BP  better   - con't home meds      4. Urinary retention  - c/w beaulieu  - c/w Flomax  - D/w DR Morel, possibly might have prostate CA, recommends to continue Beaulieu, voiding trial in a week in his office, add finasteride       Dispo: d/c planning to [facility] in am

## 2018-10-19 NOTE — CONSULT NOTE ADULT - SUBJECTIVE AND OBJECTIVE BOX
89yo man with progressive dementia brought to ED due to worsening confusion and was found to be in AUR. Beaulieu placed by nursing staff with 700mL drained.    Consultation requested for AUR    Patient seen and examined at bedside. Daughter and wife present.  Family provide answers HPI    No history of weak stream, frequency, urgency, straining, hematuria, bone pain, weight loss    AVSS  Gen: elderly frail man NAD, Alert  Psych: pleasantly demented  Chest: good inspiratory effort  Abd: soft, NT, ND  : beaulieu in situ draining clear yellow urine, uncircumcised phallus, testicles without tenderness or swelling  RADHA: good tone, significantly enlarged and nodular prostate (cT3a/b)  Ext: no LE edema    BUN/Cr 34/1.44  UA: neg    No abdominal imaging 89yo man with progressive dementia brought to ED due to worsening confusion and was found to be in AUR. Beaulieu placed by nursing staff with 700mL drained.    Consultation requested for AUR    Patient seen and examined at bedside. Daughter and wife present.  Family provide answers HPI    No history of weak stream, frequency, urgency, straining, hematuria, bone pain, weight loss    AVSS  Gen: elderly frail man NAD, Alert  Psych: pleasantly demented  Chest: good inspiratory effort  Abd: soft, NT, ND  : beaulieu in situ draining clear yellow urine, uncircumcised phallus, testicles without tenderness or swelling  RADHA: good tone, significantly enlarged and nodular prostate (cT3a/b)  Ext: no LE edema    BUN/Cr 34/1.44  Ucx: neg    No abdominal imaging

## 2018-10-19 NOTE — PROGRESS NOTE ADULT - ASSESSMENT
89y old Male coming from home with hx of Dementia (since 2008, FAST 5), HTN, admitted 10/16 for worsening confusion. As per wife and niece pt's confusion has worsened recently mostly confused at nights and less in the day time, does not recognize her or family members, but his confusion has worsened within the past 24 hours. Spouse and niece note that pt is ambulatory at baseline (without any device), able to dress, toilet, and feed self. Found here to have mild elevation in Cr (1.7), hyperkalemia (5.5), and CTH negative for acute pathology, pending urinary studies with 600cc on bedside bladder scan. Palliative Care consulted to assist with establishing GOC.     1) AMS/Dementia  - hx of dementia, FAST5-6, not end stage yet  - CTH ruling out any acute pathology  - likely sundowning  - now less likely contributed to by infection, UCx negative thus far  - given pt still requiring 1:1 and doses IM haldol overnight. Increased QHS seroquel dose to 50mg qhs and kept 12.5mg qd prn, but refused po meds  - Recommend consideration of moving pt to room on different floor with windows for non-pharmacological means of managing delirium and keeping in mind that may have issues with placement if still needing 1:1 and IV sx management  - frequent reorientation  - c/w with back up dose of haldol 2mg IM q4h prn    2) ?Infection  - ID notes appreciated  - UA mildly positive but no other signs of infection  - UCx negative  - awaiting decision cc: further abx    3) CORINA  - likely prerenal  - IVF  - encourage po intake, which wife says he usually drinks well  - c/w monitoring    4) Debility  - seems to have good physical function at home until today  - some evidence of muscle/fat wasting  - PT eval appreciated- [facility] or LTC recommended    5) Prognosis  - likely overall poor  - progressive dementia, though not yet at end stage. Unclear though what new baseline will be. If does not rally or continues to decline would possibly then be a hospice candidate, not so at this time.     6) GOC/Advanced Directives  - pt does not have capacity for decision making  - no HCP, surrogate is wife: Vivienne: [phone]  - DNR and DNI. MOLST completed: DNR, limited medical interventions, DNI, send to hospital, no feeding tube, trial of IVF, determine use of abx, no dialysis, ok for transfusions, no ICU, no pressors  - GOC meeting held 10/17 and completed 10/18- family hoping for support in figuring out how best to support him after this, interested in [facility] assessment and open to full discussion of all options. Floor SW to work with pt's family cc: placement    Thank you for including us in Mr. Salazar's care. Will continue to follow with you.    Codey Stubbs MD  Palliative Care Attending

## 2018-10-19 NOTE — PROGRESS NOTE ADULT - SUBJECTIVE AND OBJECTIVE BOX
HPI: Pt seen and examined this am in follow up for sx, 1:1 at bedside. Overnight and this am pt refused all meds including his seroquel, 1 dose of IM haldol given overnight. Pt denies any pain or discomfort, pleasant this am, confused, but cooperative. Notes eating breakfast this am.      PAIN: denies  DYSPNEA: denies      ROS:  All other systems reviewed and negative      PHYSICAL EXAM:    Vital Signs Last 24 Hrs  T(C): 36.9 (19 Oct 2018 05:17), Max: 36.9 (19 Oct 2018 05:17)  T(F): 98.5 (19 Oct 2018 05:17), Max: 98.5 (19 Oct 2018 05:17)  HR: 75 (19 Oct 2018 05:17) (52 - 75)  BP: 154/60 (19 Oct 2018 05:17) (100/48 - 154/60)  RR: 20 (19 Oct 2018 05:17) (18 - 20)  SpO2: 100% (19 Oct 2018 05:17) (98% - 100%)  Daily     Daily     PPSV2: 30  %    General: Elderly male sitting up in bed, smiling, cooperative when redirected follows instructions, NAD  Mental Status: AOx1 (self only)  HEENT: dmm, perrl, eomi, some temporal wasting  Lungs: clear  Cardiac: +s1 s2 rrr  GI: soft nt nd +bs  : beaulieu in place draining yellow urine  Ext: moves all extremities, no edema  Neuro: no focal deficits outside of confusion      LABS:                        11.6   7.27  )-----------( 199      ( 18 Oct 2018 05:58 )             34.2     10-19    143  |  112<H>  |  34<H>  ----------------------------<  86  4.0   |  22  |  1.44<H>    Ca    8.6      19 Oct 2018 06:12        Albumin: Albumin, Serum: 3.6 g/dL (10-16 @ 02:28)      Allergies    No Known Allergies    Intolerances      MEDICATIONS  (STANDING):  amLODIPine   Tablet 10 milliGRAM(s) Oral daily  ATENolol  Tablet 50 milliGRAM(s) Oral daily  heparin  Injectable 5000 Unit(s) SubCutaneous every 12 hours  QUEtiapine 50 milliGRAM(s) Oral at bedtime  sodium chloride 0.9%. 1000 milliLiter(s) (50 mL/Hr) IV Continuous <Continuous>  tamsulosin 0.4 milliGRAM(s) Oral at bedtime    MEDICATIONS  (PRN):  haloperidol    Injectable 2 milliGRAM(s) IntraMuscular every 4 hours PRN agitation  QUEtiapine 12.5 milliGRAM(s) Oral daily PRN agitation

## 2018-10-19 NOTE — CONSULT NOTE ADULT - ASSESSMENT
BPH with Acute urinary retention  - c/w flomax  - start finasteride 5mg daily  - TOV on monday (outpatient vs inpatient)    Nodular prostate exam  - explained to family that based on his age alone, he has a 90% of having prostate cancer. His exam is concerning however family agrees that doing any testing for cancer would be more harmful than beneficial  I would only recommend PSA testing and CT A/P if he fails TOV with flomax because alpha blockers don't work in advanced prostate cancer    Thank you for this consult    Kailash Morel MD BPH with Acute urinary retention  - c/w flomax  - start finasteride 5mg daily  - TOV on monday (outpatient vs inpatient)    Nodular prostate exam  - explained to family that based on his age alone, he has a 90% of having prostate cancer. His exam is concerning however family agrees that doing any testing for cancer would be more harmful than beneficial  I would only recommend PSA testing and CT A/P if he fails TOV with flomax because alpha blockers don't work in advanced prostate cancer    Thank you for this consult    Kailash Morel MD  Office [phone]

## 2018-10-20 RX ORDER — ATENOLOL 25 MG/1
50 TABLET ORAL DAILY
Qty: 0 | Refills: 0 | Status: DISCONTINUED | OUTPATIENT
Start: 2018-10-20 | End: 2018-10-21

## 2018-10-20 RX ORDER — FOLIC ACID 0.8 MG
1 TABLET ORAL DAILY
Qty: 0 | Refills: 0 | Status: DISCONTINUED | OUTPATIENT
Start: 2018-10-20 | End: 2018-10-23

## 2018-10-20 RX ORDER — PREGABALIN 225 MG/1
1000 CAPSULE ORAL DAILY
Qty: 0 | Refills: 0 | Status: DISCONTINUED | OUTPATIENT
Start: 2018-10-20 | End: 2018-10-23

## 2018-10-20 RX ADMIN — QUETIAPINE FUMARATE 50 MILLIGRAM(S): 200 TABLET, FILM COATED ORAL at 22:55

## 2018-10-20 RX ADMIN — HEPARIN SODIUM 5000 UNIT(S): 5000 INJECTION INTRAVENOUS; SUBCUTANEOUS at 17:04

## 2018-10-20 RX ADMIN — QUETIAPINE FUMARATE 12.5 MILLIGRAM(S): 200 TABLET, FILM COATED ORAL at 11:03

## 2018-10-20 RX ADMIN — TAMSULOSIN HYDROCHLORIDE 0.4 MILLIGRAM(S): 0.4 CAPSULE ORAL at 22:55

## 2018-10-20 RX ADMIN — Medication 1 MILLIGRAM(S): at 11:43

## 2018-10-20 RX ADMIN — HEPARIN SODIUM 5000 UNIT(S): 5000 INJECTION INTRAVENOUS; SUBCUTANEOUS at 05:17

## 2018-10-20 RX ADMIN — PREGABALIN 1000 MICROGRAM(S): 225 CAPSULE ORAL at 11:42

## 2018-10-20 RX ADMIN — AMLODIPINE BESYLATE 10 MILLIGRAM(S): 2.5 TABLET ORAL at 05:16

## 2018-10-20 RX ADMIN — FINASTERIDE 5 MILLIGRAM(S): 5 TABLET, FILM COATED ORAL at 11:43

## 2018-10-20 RX ADMIN — ATENOLOL 50 MILLIGRAM(S): 25 TABLET ORAL at 05:16

## 2018-10-20 NOTE — PROGRESS NOTE ADULT - SUBJECTIVE AND OBJECTIVE BOX
CC: Worsening confusion (16 Oct 2018 15:33)    HPI:  89 year old Man with pmhx of HTN, dementia, BIBA accompanied by spouse for worsening confusion. As per wife and niece pt's confusion has worsened recently. Spouse reports pt is usually very confused at nights and less in the day time, does not recognize her or family members, but his confusion has worsened within the past 24 hours. Spouse and niece that pt is ambulatory at baseline, able to toilet self. Pt was c/o not being able to urinate and pain with urination    and feed self.     In ED CT head negative for acute pathology, K 5.5, Crt 1.73, /56. UA +     INTERVAL HPI/ OVERNIGHT EVENTS: , Pt was seen and examined, slightly agitated earlier wanted to go home, refused breakfast. Now comfortable, denies any complains, agreed to eat something.     Vital Signs Last 24 Hrs  T(C): 36.7 (20 Oct 2018 17:01), Max: 36.8 (20 Oct 2018 05:08)  T(F): 98 (20 Oct 2018 17:01), Max: 98.3 (20 Oct 2018 05:08)  HR: 59 (20 Oct 2018 17:01) (59 - 112)  BP: 100/41 (20 Oct 2018 17:01) (100/41 - 112/69)  RR: 18 (20 Oct 2018 17:01) (18 - 18)  SpO2: 99% (20 Oct 2018 17:01) (94% - 99%)    REVIEW OF SYSTEMS:  Unable top assess due  to confusion     PHYSICAL EXAM:  General: Well developed;  in no acute distress  Eyes: PERRLA, EOMI; conjunctiva and sclera clear  Head: Normocephalic; atraumatic  ENMT: No nasal discharge; airway clear  Neck: Supple; noJVD  Respiratory: Good air entry b/l, No wheezes, rales or rhonchi  Cardiovascular: Regular rate and rhythm. S1 and S2 Normal; No murmurs  Gastrointestinal: Soft non-tender non-distended; Normal bowel sounds  Genitourinary: No costovertebral angle tenderness, no suprapubic pain, Jimenez in place   Extremities: Normal range of motion, No clubbing, cyanosis or edema  Vascular: Peripheral pulses palpable 2+ bilaterally  Neurological: Alert and oriented x1, non focal   Skin: Warm and dry. No acute rash  Lymph Nodes: No acute cervical adenopathy  Musculoskeletal: Normal gait, tone, without deformities  Psychiatric: Cooperative and appropriate    LABS:                                        11.6   7.27  )-----------( 199      ( 18 Oct 2018 05:58 )             34.2     10-18    144  |  111<H>  |  33<H>  ----------------------------<  89  4.1   |  22  |  1.77<H>    Ca    8.7      18 Oct 2018 05:58                              11.2   6.46  )-----------( 201      ( 16 Oct 2018 02:28 )             33.6     17 Oct 2018 05:44    145    |  112    |  26     ----------------------------<  89     4.6     |  24     |  1.21     Ca    8.8        17 Oct 2018 05:44    TPro  6.6    /  Alb  3.6    /  TBili  0.5    /  DBili  x      /  AST  15     /  ALT  12     /  AlkPhos  50     16 Oct 2018 02:28  LIVER FUNCTIONS - ( 16 Oct 2018 02:28 )  Alb: 3.6 g/dL / Pro: 6.6 gm/dL / ALK PHOS: 50 U/L / ALT: 12 U/L / AST: 15 U/L / GGT: x             Urinalysis Basic - ( 16 Oct 2018 15:21 )  Color: Yellow / Appearance: Clear / S.015 / pH: x  Gluc: x / Ketone: Negative  / Bili: Negative / Urobili: Negative mg/dL   Blood: x / Protein: 15 mg/dL / Nitrite: Negative   Leuk Esterase: Negative / RBC: >50 /HPF / WBC 6-10   Sq Epi: x / Non Sq Epi: Occasional / Bacteria: Occasional    Culture - Urine (10.16.18 @ 15:21)    Specimen Source: .Urine None    Culture Results:   No growth        MEDICATIONS  (STANDING):  amLODIPine   Tablet 10 milliGRAM(s) Oral daily  ATENolol  Tablet 50 milliGRAM(s) Oral daily  enoxaparin Injectable 40 milliGRAM(s) SubCutaneous daily  QUEtiapine 50 milliGRAM(s) Oral at bedtime  sodium chloride 0.9%. 1000 milliLiter(s) (50 mL/Hr) IV Continuous <Continuous>  tamsulosin 0.4 milliGRAM(s) Oral at bedtime    MEDICATIONS  (PRN):  haloperidol    Injectable 2 milliGRAM(s) IntraMuscular every 4 hours PRN agitation  QUEtiapine 12.5 milliGRAM(s) Oral daily PRN agitation      RADIOLOGY & ADDITIONAL TESTS:    EXAM:  CT BRAIN                        PROCEDURE DATE:  10/16/2018        FINDINGS:    There is no acute intracranial hemorrhage, mass effect, midline shift,   extra-axial collection, hydrocephalus, or evidence of acute vascular   territorial infarction. Mild patchy hypodensities within the   periventricular and subcortical white matter, although nonspecific,   likely reflect chronic microvascular disease. Cerebral volume loss   results in prominence of the ventricles and sulci. Intracranial   atherosclerosis is present.    The visualized paranasal sinuses and mastoid air cells are clear.   Intracranial contents are unremarkable. Visualized osseous structures are   intact.    IMPRESSION:     No acute intracranial hemorrhage, mass effect, or evidenceof acute   vascular territorial infarction.    If clinical symptoms persist or worsen, more sensitive evaluation with   brain MRI may be obtained, if no contraindications exist.

## 2018-10-20 NOTE — PROGRESS NOTE ADULT - ASSESSMENT
89 year old Man with pmhx of HTN, dementia, CKD admitted for:     1. AMS likely  metabolic encephalopathy  due to Urinary retention and Dehydration, baseline Alzheimers' dementia   - C/w  1:1 at bedside for safety  - fall precautions  - supportive care   - started on  seroquel 12.5 mg am PRN, c/w seroquel to 50mg QHS standing     -PT   - SW    2. CORINA /CKD stage 3 prerenal    - CR improved with IVF, now likely at baseline   -  UO good   - C/w  Beaulieu, failed  voiding trial   - receck BMP in few days     3. Essential HTN  - BP  better   - con't home meds      4. Urinary retention  - c/w beaulieu  - c/w Flomax  - D/w DR Morel, possibly might have prostate CA, recommends to continue Beaulieu, voiding trial in a week in his office, add finasteride     5. Vit B12 def  will start supplementation 1000mcg SQ QD x 5 doses       Dispo: d/c planning to [facility] when bed available. As per d/c  no beds today

## 2018-10-21 RX ORDER — ATENOLOL 25 MG/1
25 TABLET ORAL
Qty: 0 | Refills: 0 | Status: DISCONTINUED | OUTPATIENT
Start: 2018-10-22 | End: 2018-10-23

## 2018-10-21 RX ADMIN — AMLODIPINE BESYLATE 10 MILLIGRAM(S): 2.5 TABLET ORAL at 06:43

## 2018-10-21 RX ADMIN — HEPARIN SODIUM 5000 UNIT(S): 5000 INJECTION INTRAVENOUS; SUBCUTANEOUS at 18:03

## 2018-10-21 RX ADMIN — ATENOLOL 50 MILLIGRAM(S): 25 TABLET ORAL at 06:43

## 2018-10-21 RX ADMIN — FINASTERIDE 5 MILLIGRAM(S): 5 TABLET, FILM COATED ORAL at 12:48

## 2018-10-21 RX ADMIN — PREGABALIN 1000 MICROGRAM(S): 225 CAPSULE ORAL at 18:03

## 2018-10-21 RX ADMIN — Medication 1 MILLIGRAM(S): at 12:48

## 2018-10-21 RX ADMIN — HALOPERIDOL DECANOATE 2 MILLIGRAM(S): 100 INJECTION INTRAMUSCULAR at 18:03

## 2018-10-21 RX ADMIN — QUETIAPINE FUMARATE 50 MILLIGRAM(S): 200 TABLET, FILM COATED ORAL at 20:56

## 2018-10-21 RX ADMIN — HALOPERIDOL DECANOATE 2 MILLIGRAM(S): 100 INJECTION INTRAMUSCULAR at 03:45

## 2018-10-21 RX ADMIN — TAMSULOSIN HYDROCHLORIDE 0.4 MILLIGRAM(S): 0.4 CAPSULE ORAL at 20:55

## 2018-10-21 RX ADMIN — HEPARIN SODIUM 5000 UNIT(S): 5000 INJECTION INTRAVENOUS; SUBCUTANEOUS at 06:43

## 2018-10-21 NOTE — DIETITIAN INITIAL EVALUATION ADULT. - OTHER INFO
pt seen as LOS: 89yo male with PMH of HTN, dementia p/w worsening confusion likely 2/2 metabolic encephalopathy due to urinary retention and dehydration with baseline alzheimer's dementia.  CORINA on CKD, now back to baseline.  Upon visit, pt is very confused and unable to provide simple answers.  Aide at beside reports pt has not been eating, however, will drink juice.  Based on recall, pt meeting <25% of estimated nutr needs since admission (5 days).  NFPE significant for moderate temporal, clavicle muscle wasting.  moderate orbital fat wasting.  no wt hx in EMR and pt unable to provide information.  no edema noted. no BM since admission, pt not on bowel regimen; monitor and add bowel regimen prn.  silke score of 16 with no PU.  Based on above, pt meets criteria for moderate malnutrition in chronic illness (dementia).  RECOMMENDATIONS: 1) add ensure enlive TID 2) add MVI with minerals daily to ensure 100% of RDI met 3) monitor BM, add bowel regimen prn 4) provide maximum assistance/encouragement with all PO intake

## 2018-10-21 NOTE — DIETITIAN INITIAL EVALUATION ADULT. - PHYSICAL APPEARANCE
other (specify)/overweight/moderate temporal, clavicle muscle wasting.  moderate orbital fat wasting.

## 2018-10-21 NOTE — PROGRESS NOTE ADULT - SUBJECTIVE AND OBJECTIVE BOX
CC: Worsening confusion (16 Oct 2018 15:33)    HPI:  89 year old Man with pmhx of HTN, dementia, BIBA accompanied by spouse for worsening confusion. As per wife and niece pt's confusion has worsened recently. Spouse reports pt is usually very confused at nights and less in the day time, does not recognize her or family members, but his confusion has worsened within the past 24 hours. Spouse and niece that pt is ambulatory at baseline, able to toilet self. Pt was c/o not being able to urinate and pain with urination    and feed self.     In ED CT head negative for acute pathology, K 5.5, Crt 1.73, /56. UA +     INTERVAL HPI/ OVERNIGHT EVENTS: , Pt was seen and examined, agitated, wants to get up and walk, family at bedside, updated      Vital Signs Last 24 Hrs  T(C): 36.4 (21 Oct 2018 11:10), Max: 36.9 (20 Oct 2018 21:13)  T(F): 97.5 (21 Oct 2018 11:10), Max: 98.4 (20 Oct 2018 21:13)  HR: 55 (21 Oct 2018 11:10) (55 - 84)  BP: 98/46 (21 Oct 2018 11:10) (98/46 - 140/49)  BP(mean): --  RR: 18 (21 Oct 2018 11:10) (18 - 18)  SpO2: 100% (21 Oct 2018 11:10) (92% - 100%)    REVIEW OF SYSTEMS:  Unable top assess due  to confusion     PHYSICAL EXAM:  General: Well developed;  in no acute distress  Eyes: PERRLA, EOMI; conjunctiva and sclera clear  Head: Normocephalic; atraumatic  ENMT: No nasal discharge; airway clear  Neck: Supple; noJVD  Respiratory: Good air entry b/l, No wheezes, rales or rhonchi  Cardiovascular: Regular rate and rhythm. S1 and S2 Normal; No murmurs  Gastrointestinal: Soft non-tender non-distended; Normal bowel sounds  Genitourinary: No costovertebral angle tenderness, no suprapubic pain, Jimenez in place   Extremities: Normal range of motion, No clubbing, cyanosis or edema  Vascular: Peripheral pulses palpable 2+ bilaterally  Neurological: Alert and oriented x1, non focal   Skin: Warm and dry. No acute rash  Lymph Nodes: No acute cervical adenopathy  Musculoskeletal: Normal gait, tone, without deformities  Psychiatric: Cooperative and appropriate    LABS:                                        11.6   7.27  )-----------( 199      ( 18 Oct 2018 05:58 )             34.2     10-18    144  |  111<H>  |  33<H>  ----------------------------<  89  4.1   |  22  |  1.77<H>    Ca    8.7      18 Oct 2018 05:58                              11.2   6.46  )-----------( 201      ( 16 Oct 2018 02:28 )             33.6     17 Oct 2018 05:44    145    |  112    |  26     ----------------------------<  89     4.6     |  24     |  1.21     Ca    8.8        17 Oct 2018 05:44    TPro  6.6    /  Alb  3.6    /  TBili  0.5    /  DBili  x      /  AST  15     /  ALT  12     /  AlkPhos  50     16 Oct 2018 02:28  LIVER FUNCTIONS - ( 16 Oct 2018 02:28 )  Alb: 3.6 g/dL / Pro: 6.6 gm/dL / ALK PHOS: 50 U/L / ALT: 12 U/L / AST: 15 U/L / GGT: x             Urinalysis Basic - ( 16 Oct 2018 15:21 )  Color: Yellow / Appearance: Clear / S.015 / pH: x  Gluc: x / Ketone: Negative  / Bili: Negative / Urobili: Negative mg/dL   Blood: x / Protein: 15 mg/dL / Nitrite: Negative   Leuk Esterase: Negative / RBC: >50 /HPF / WBC 6-10   Sq Epi: x / Non Sq Epi: Occasional / Bacteria: Occasional    Culture - Urine (10.16.18 @ 15:21)    Specimen Source: .Urine None    Culture Results:   No growth        MEDICATIONS  (STANDING):  amLODIPine   Tablet 10 milliGRAM(s) Oral daily  cyanocobalamin Injectable 1000 MICROGram(s) SubCutaneous daily  finasteride 5 milliGRAM(s) Oral daily  folic acid 1 milliGRAM(s) Oral daily  heparin  Injectable 5000 Unit(s) SubCutaneous every 12 hours  QUEtiapine 50 milliGRAM(s) Oral at bedtime  sodium chloride 0.9%. 1000 milliLiter(s) (50 mL/Hr) IV Continuous <Continuous>  tamsulosin 0.4 milliGRAM(s) Oral at bedtime    MEDICATIONS  (PRN):  haloperidol    Injectable 2 milliGRAM(s) IntraMuscular every 4 hours PRN agitation  QUEtiapine 12.5 milliGRAM(s) Oral daily PRN agitation      RADIOLOGY & ADDITIONAL TESTS:    EXAM:  CT BRAIN                        PROCEDURE DATE:  10/16/2018        FINDINGS:    There is no acute intracranial hemorrhage, mass effect, midline shift,   extra-axial collection, hydrocephalus, or evidence of acute vascular   territorial infarction. Mild patchy hypodensities within the   periventricular and subcortical white matter, although nonspecific,   likely reflect chronic microvascular disease. Cerebral volume loss   results in prominence of the ventricles and sulci. Intracranial   atherosclerosis is present.    The visualized paranasal sinuses and mastoid air cells are clear.   Intracranial contents are unremarkable. Visualized osseous structures are   intact.    IMPRESSION:     No acute intracranial hemorrhage, mass effect, or evidenceof acute   vascular territorial infarction.    If clinical symptoms persist or worsen, more sensitive evaluation with   brain MRI may be obtained, if no contraindications exist.

## 2018-10-21 NOTE — PROGRESS NOTE ADULT - ASSESSMENT
89 year old Man with pmhx of HTN, dementia, CKD admitted for:     1. AMS likely  metabolic encephalopathy  due to Urinary retention and Dehydration, baseline Alzheimers' dementia   - C/w  1:1 at bedside for safety  - fall precautions  - supportive care   - c/w   seroquel 12.5 mg am PRN, c/w seroquel to 50mg QHS standing     -PT   - SW    2. CORINA /CKD stage 3 prerenal    - CR improved with IVF, now likely at baseline   -  UO good   - C/w  Beaulieu, failed  voiding trial possibly on Monday   - receck BMP in few days     3. Essential HTN  - BP  better   - con't home meds      4. Urinary retention  - c/w beaulieu  - c/w Flomax and finasteride   - D/w DR Morel, possibly might have prostate CA, recommends to continue Beaulieu, voiding trial on Monday vs  in a week in his office    5. Vit B12 def  start supplementation 1000mcg SQ QD x 5 doses       Dispo: d/c planning to [facility] when bed available. As per d/c SW  on Monday

## 2018-10-21 NOTE — DIETITIAN INITIAL EVALUATION ADULT. - PERTINENT MEDS FT
MEDICATIONS  (STANDING):  amLODIPine   Tablet 10 milliGRAM(s) Oral daily  ATENolol  Tablet 50 milliGRAM(s) Oral daily  cyanocobalamin Injectable 1000 MICROGram(s) SubCutaneous daily  finasteride 5 milliGRAM(s) Oral daily  folic acid 1 milliGRAM(s) Oral daily  heparin  Injectable 5000 Unit(s) SubCutaneous every 12 hours  QUEtiapine 50 milliGRAM(s) Oral at bedtime  sodium chloride 0.9%. 1000 milliLiter(s) (50 mL/Hr) IV Continuous <Continuous>  tamsulosin 0.4 milliGRAM(s) Oral at bedtime    MEDICATIONS  (PRN):  haloperidol    Injectable 2 milliGRAM(s) IntraMuscular every 4 hours PRN agitation  QUEtiapine 12.5 milliGRAM(s) Oral daily PRN agitation

## 2018-10-21 NOTE — CHART NOTE - NSCHARTNOTEFT_GEN_A_CORE
Upon Nutritional Assessment by the Registered Dietitian your patient was determined to meet criteria / has evidence of the following diagnosis/diagnoses:          [ ]  Mild Protein Calorie Malnutrition        [x]  Moderate Protein Calorie Malnutrition        [ ] Severe Protein Calorie Malnutrition        [ ] Unspecified Protein Calorie Malnutrition        [ ] Underweight / BMI <19        [ ] Morbid Obesity / BMI > 40      Findings:  Upon visit, pt is very confused and unable to provide simple answers.  Aide at beside reports pt has not been eating, however, will drink juice.  Based on recall, pt meeting <25% of estimated nutr needs since admission (5 days).  NFPE significant for moderate temporal, clavicle muscle wasting.  moderate orbital fat wasting.  no wt hx in EMR and pt unable to provide information.  no edema noted. no BM since admission, pt not on bowel regimen; monitor and add bowel regimen prn.  silke score of 16 with no PU.  Based on above, pt meets criteria for moderate malnutrition in chronic illness (dementia).      Findings as based on:  •  Comprehensive nutrition assessment and consultation  •  Calorie counts (nutrient intake analysis)  •  Food acceptance and intake status from observations by staff  •  Follow up  •  Patient education  •  Intervention secondary to interdisciplinary rounds  •   concerns      Treatment:    The following diet has been recommended:  1) add ensure enlive TID   2) add MVI with minerals daily to ensure 100% of RDI met   3) monitor BM, add bowel regimen prn   4) provide maximum assistance/encouragement with all PO intake     PROVIDER Section:     By signing this assessment you are acknowledging and agree with the diagnosis/diagnoses assigned by the Registered Dietitian    Comments: Statement Selected

## 2018-10-22 RX ORDER — QUETIAPINE FUMARATE 200 MG/1
12.5 TABLET, FILM COATED ORAL DAILY
Qty: 0 | Refills: 0 | Status: DISCONTINUED | OUTPATIENT
Start: 2018-10-22 | End: 2018-10-23

## 2018-10-22 RX ADMIN — HALOPERIDOL DECANOATE 2 MILLIGRAM(S): 100 INJECTION INTRAMUSCULAR at 05:59

## 2018-10-22 RX ADMIN — TAMSULOSIN HYDROCHLORIDE 0.4 MILLIGRAM(S): 0.4 CAPSULE ORAL at 21:53

## 2018-10-22 RX ADMIN — AMLODIPINE BESYLATE 10 MILLIGRAM(S): 2.5 TABLET ORAL at 05:59

## 2018-10-22 RX ADMIN — HEPARIN SODIUM 5000 UNIT(S): 5000 INJECTION INTRAVENOUS; SUBCUTANEOUS at 17:13

## 2018-10-22 RX ADMIN — ATENOLOL 25 MILLIGRAM(S): 25 TABLET ORAL at 17:13

## 2018-10-22 RX ADMIN — ATENOLOL 25 MILLIGRAM(S): 25 TABLET ORAL at 05:59

## 2018-10-22 RX ADMIN — HALOPERIDOL DECANOATE 2 MILLIGRAM(S): 100 INJECTION INTRAMUSCULAR at 00:57

## 2018-10-22 RX ADMIN — Medication 1 MILLIGRAM(S): at 11:30

## 2018-10-22 RX ADMIN — QUETIAPINE FUMARATE 50 MILLIGRAM(S): 200 TABLET, FILM COATED ORAL at 21:53

## 2018-10-22 RX ADMIN — PREGABALIN 1000 MICROGRAM(S): 225 CAPSULE ORAL at 11:29

## 2018-10-22 RX ADMIN — FINASTERIDE 5 MILLIGRAM(S): 5 TABLET, FILM COATED ORAL at 11:30

## 2018-10-22 RX ADMIN — HEPARIN SODIUM 5000 UNIT(S): 5000 INJECTION INTRAVENOUS; SUBCUTANEOUS at 05:59

## 2018-10-22 NOTE — PROGRESS NOTE ADULT - ASSESSMENT
89 year old Man with pmhx of HTN, dementia, CKD admitted for:     1. AMS likely  metabolic encephalopathy  due to Urinary retention and Dehydration, baseline Alzheimers' dementia   - C/w  1:1 at bedside for safety  - fall precautions  - supportive care   - c/w   seroquel 12.5 mg am change to standing order, c/w seroquel to 50mg QHS standing     -PT   - SW    2. CORINA /CKD stage 3 prerenal    - CR improved with IVF, now likely at baseline   -  UO good   - C/w  Jimenez, failed  voiding trial possibly on Monday   - receck BMP in few days     3. Essential HTN  - BP  better   - con't home meds      4. Urinary retention. with Jimenez. BPH, possible prostate CA    - Voiding trial, bladder scan if not urinating   - c/w Flomax and finasteride       5. Vit B12 def  start supplementation 1000mcg SQ QD x 5 doses       Dispo: d/c planning  ongoing, d/w SW ? id [email or web address] be accepted to [facility]

## 2018-10-22 NOTE — PROGRESS NOTE ADULT - SUBJECTIVE AND OBJECTIVE BOX
CC: Worsening confusion (16 Oct 2018 15:33)    HPI:  89 year old Man with pmhx of HTN, dementia, BIBA accompanied by spouse for worsening confusion. As per wife and niece pt's confusion has worsened recently. Spouse reports pt is usually very confused at nights and less in the day time, does not recognize her or family members, but his confusion has worsened within the past 24 hours. Spouse and niece that pt is ambulatory at baseline, able to toilet self. Pt was c/o not being able to urinate and pain with urination    and feed self.     In ED CT head negative for acute pathology, K 5.5, Crt 1.73, /56. UA +     INTERVAL HPI/ OVERNIGHT EVENTS: , Pt was seen and examined,  sleeping in bed, was agitated last night, received Haldol IM x 2. Family at bedside     Vital Signs Last 24 Hrs  T(C): 36.8 (22 Oct 2018 16:17), Max: 36.8 (22 Oct 2018 16:17)  T(F): 98.2 (22 Oct 2018 16:17), Max: 98.2 (22 Oct 2018 16:17)  HR: 56 (22 Oct 2018 16:17) (56 - 66)  BP: 123/51 (22 Oct 2018 16:17) (123/51 - 141/48)  RR: 18 (22 Oct 2018 16:17) (18 - 18)  SpO2: 99% (22 Oct 2018 11:59) (99% - 100%)    REVIEW OF SYSTEMS:  Unable top assess due  to confusion     PHYSICAL EXAM:  General: Well developed;  in no acute distress  Eyes: PERRLA, EOMI; conjunctiva and sclera clear  Head: Normocephalic; atraumatic  ENMT: No nasal discharge; airway clear  Neck: Supple; noJVD  Respiratory: Good air entry b/l, No wheezes, rales or rhonchi  Cardiovascular: Regular rate and rhythm. S1 and S2 Normal; No murmurs  Gastrointestinal: Soft non-tender non-distended; Normal bowel sounds  Genitourinary: No costovertebral angle tenderness, no suprapubic pain, Jimenez in place   Extremities: Normal range of motion, No clubbing, cyanosis or edema  Vascular: Peripheral pulses palpable 2+ bilaterally  Neurological: asleep, arousible briefly, , non focal   Skin: Warm and dry. No acute rash  Lymph Nodes: No acute cervical adenopathy  Musculoskeletal: Normal muscle tone, without deformities  Psychiatric: sedated     LABS:                                        11.6   7.27  )-----------( 199      ( 18 Oct 2018 05:58 )             34.2     10-18    144  |  111<H>  |  33<H>  ----------------------------<  89  4.1   |  22  |  1.77<H>    Ca    8.7      18 Oct 2018 05:58                              11.2   6.46  )-----------( 201      ( 16 Oct 2018 02:28 )             33.6     17 Oct 2018 05:44    145    |  112    |  26     ----------------------------<  89     4.6     |  24     |  1.21     Ca    8.8        17 Oct 2018 05:44    TPro  6.6    /  Alb  3.6    /  TBili  0.5    /  DBili  x      /  AST  15     /  ALT  12     /  AlkPhos  50     16 Oct 2018 02:28  LIVER FUNCTIONS - ( 16 Oct 2018 02:28 )  Alb: 3.6 g/dL / Pro: 6.6 gm/dL / ALK PHOS: 50 U/L / ALT: 12 U/L / AST: 15 U/L / GGT: x             Urinalysis Basic - ( 16 Oct 2018 15:21 )  Color: Yellow / Appearance: Clear / S.015 / pH: x  Gluc: x / Ketone: Negative  / Bili: Negative / Urobili: Negative mg/dL   Blood: x / Protein: 15 mg/dL / Nitrite: Negative   Leuk Esterase: Negative / RBC: >50 /HPF / WBC 6-10   Sq Epi: x / Non Sq Epi: Occasional / Bacteria: Occasional    Culture - Urine (10.16.18 @ 15:21)    Specimen Source: .Urine None    Culture Results:   No growth        MEDICATIONS  (STANDING):  amLODIPine   Tablet 10 milliGRAM(s) Oral daily  cyanocobalamin Injectable 1000 MICROGram(s) SubCutaneous daily  finasteride 5 milliGRAM(s) Oral daily  folic acid 1 milliGRAM(s) Oral daily  heparin  Injectable 5000 Unit(s) SubCutaneous every 12 hours  QUEtiapine 50 milliGRAM(s) Oral at bedtime  sodium chloride 0.9%. 1000 milliLiter(s) (50 mL/Hr) IV Continuous <Continuous>  tamsulosin 0.4 milliGRAM(s) Oral at bedtime    MEDICATIONS  (PRN):  haloperidol    Injectable 2 milliGRAM(s) IntraMuscular every 4 hours PRN agitation  QUEtiapine 12.5 milliGRAM(s) Oral daily PRN agitation      RADIOLOGY & ADDITIONAL TESTS:    EXAM:  CT BRAIN                        PROCEDURE DATE:  10/16/2018        FINDINGS:    There is no acute intracranial hemorrhage, mass effect, midline shift,   extra-axial collection, hydrocephalus, or evidence of acute vascular   territorial infarction. Mild patchy hypodensities within the   periventricular and subcortical white matter, although nonspecific,   likely reflect chronic microvascular disease. Cerebral volume loss   results in prominence of the ventricles and sulci. Intracranial   atherosclerosis is present.    The visualized paranasal sinuses and mastoid air cells are clear.   Intracranial contents are unremarkable. Visualized osseous structures are   intact.    IMPRESSION:     No acute intracranial hemorrhage, mass effect, or evidenceof acute   vascular territorial infarction.    If clinical symptoms persist or worsen, more sensitive evaluation with   brain MRI may be obtained, if no contraindications exist.

## 2018-10-22 NOTE — PROGRESS NOTE ADULT - ASSESSMENT
89y old Male coming from home with hx of Dementia (since 2008, FAST 5), HTN, admitted 10/16 for worsening confusion. As per wife and niece pt's confusion has worsened recently mostly confused at nights and less in the day time, does not recognize her or family members, but his confusion has worsened within the past 24 hours. Spouse and niece note that pt is ambulatory at baseline (without any device), able to dress, toilet, and feed self. Found here to have mild elevation in Cr (1.7), hyperkalemia (5.5), and CTH negative for acute pathology, pending urinary studies with 600cc on bedside bladder scan. Palliative Care consulted to assist with establishing GOC.     1) AMS/Dementia  - hx of dementia, FAST5-6, not end stage yet  - CTH ruling out any acute pathology  - likely sundowning  - now less likely contributed to by infection, UCx negative thus far  - given pt still requiring 1:1 and doses IM haldol overnight, c/w seroquel dose to 50mg qhs and rec 25mg standing during the day  - Recommend consideration of psych consult given persistent agitation with continuous need for 1:1 and IV sx management  - frequent reorientation  - c/w with back up dose of haldol 2mg IM q4h prn    2) ?Infection  - ID notes appreciated  - UA mildly positive but no other signs of infection  - UCx negative    3) CORINA  - likely prerenal  - IVF  - encourage po intake, which wife says he usually drinks well  - c/w monitoring    4) Debility  - seems to have good physical function at home until today  - some evidence of muscle/fat wasting  - PT eval appreciated- [facility] or LTC recommended    5) Prognosis  - likely overall poor  - progressive dementia, though not yet at end stage. Unclear though what new baseline will be. If does not rally or continues to decline would possibly then be a hospice candidate, not so at this time.     6) GOC/Advanced Directives  - pt does not have capacity for decision making  - no HCP, surrogate is wife: Vivienne: [phone]  - DNR and DNI. MOLST completed: DNR, limited medical interventions, DNI, send to hospital, no feeding tube, trial of IVF, determine use of abx, no dialysis, ok for transfusions, no ICU, no pressors  - GOC meeting held 10/17 and completed 10/18- family hoping for support in figuring out how best to support him after this, interested in [facility] assessment and open to full discussion of all options. Floor SW to work with pt's family cc: placement    Thank you for including us in Mr. Salazar's care. Will continue to follow with you.    Codey Stubbs MD  Palliative Care Attending

## 2018-10-22 NOTE — PROGRESS NOTE ADULT - SUBJECTIVE AND OBJECTIVE BOX
HPI: Pt seen and examined this am in follow up for sx, 1:1 at bedside. Pt mildly agitated, eyes closed, not interacting, but resisting when 1:1 trying to take vitals. Allowed exam, but was unable to respond to questions or follow specific commands. 2 doses IM haldol given overnight with current mental status since as per 1:1.      PAIN: no nonverbal signs of pain    DYSPNEA: no nonverbal signs of dyspnea      ROS:  Unable to gather 2/2 to mental status      PHYSICAL EXAM:    Vital Signs Last 24 Hrs  T(C): 36.7 (22 Oct 2018 05:27), Max: 36.7 (22 Oct 2018 05:27)  T(F): 98 (22 Oct 2018 05:27), Max: 98 (22 Oct 2018 05:27)  HR: 66 (22 Oct 2018 05:27) (55 - 66)  BP: 139/61 (22 Oct 2018 05:27) (98/46 - 139/61)  BP(mean): --  RR: 18 (22 Oct 2018 05:27) (18 - 18)  SpO2: 100% (22 Oct 2018 05:27) (100% - 100%)  Daily     Daily     PPSV2: 30  %    General: Elderly male lying in bed, agitated, but allowed exam, not opening eyes, resisting at times, does not follow commands.   Mental Status: unable to gather  HEENT: dmm, perrl, eomi, some temporal wasting  Lungs: clear  Cardiac: +s1 s2 rrr  GI: soft nt nd +bs  : beaulieu in place draining yellow urine  Ext: moves all extremities, no edema  Neuro: unable to fully assess 2/2 to confusion    LABS: none drawn    Albumin: Albumin, Serum: 3.6 g/dL (10-16 @ 02:28)      Allergies    No Known Allergies    Intolerances      MEDICATIONS  (STANDING):  amLODIPine   Tablet 10 milliGRAM(s) Oral daily  ATENolol  Tablet 25 milliGRAM(s) Oral two times a day  cyanocobalamin Injectable 1000 MICROGram(s) SubCutaneous daily  finasteride 5 milliGRAM(s) Oral daily  folic acid 1 milliGRAM(s) Oral daily  heparin  Injectable 5000 Unit(s) SubCutaneous every 12 hours  QUEtiapine 50 milliGRAM(s) Oral at bedtime  sodium chloride 0.9%. 1000 milliLiter(s) (50 mL/Hr) IV Continuous <Continuous>  tamsulosin 0.4 milliGRAM(s) Oral at bedtime    MEDICATIONS  (PRN):  haloperidol    Injectable 2 milliGRAM(s) IntraMuscular every 4 hours PRN agitation  QUEtiapine 12.5 milliGRAM(s) Oral daily PRN agitation      RADIOLOGY:

## 2018-10-23 VITALS
HEART RATE: 54 BPM | RESPIRATION RATE: 18 BRPM | DIASTOLIC BLOOD PRESSURE: 43 MMHG | SYSTOLIC BLOOD PRESSURE: 116 MMHG | OXYGEN SATURATION: 97 % | TEMPERATURE: 98 F

## 2018-10-23 LAB
ANION GAP SERPL CALC-SCNC: 9 MMOL/L — SIGNIFICANT CHANGE UP (ref 5–17)
BUN SERPL-MCNC: 50 MG/DL — HIGH (ref 7–23)
CALCIUM SERPL-MCNC: 9.5 MG/DL — SIGNIFICANT CHANGE UP (ref 8.5–10.1)
CHLORIDE SERPL-SCNC: 111 MMOL/L — HIGH (ref 96–108)
CO2 SERPL-SCNC: 25 MMOL/L — SIGNIFICANT CHANGE UP (ref 22–31)
CREAT SERPL-MCNC: 1.27 MG/DL — SIGNIFICANT CHANGE UP (ref 0.5–1.3)
GLUCOSE SERPL-MCNC: 90 MG/DL — SIGNIFICANT CHANGE UP (ref 70–99)
HCT VFR BLD CALC: 34.8 % — LOW (ref 39–50)
HGB BLD-MCNC: 11.8 G/DL — LOW (ref 13–17)
MCHC RBC-ENTMCNC: 33.9 GM/DL — SIGNIFICANT CHANGE UP (ref 32–36)
MCHC RBC-ENTMCNC: 34.8 PG — HIGH (ref 27–34)
MCV RBC AUTO: 102.7 FL — HIGH (ref 80–100)
NRBC # BLD: 0 /100 WBCS — SIGNIFICANT CHANGE UP (ref 0–0)
PLATELET # BLD AUTO: 236 K/UL — SIGNIFICANT CHANGE UP (ref 150–400)
POTASSIUM SERPL-MCNC: 4.8 MMOL/L — SIGNIFICANT CHANGE UP (ref 3.5–5.3)
POTASSIUM SERPL-SCNC: 4.8 MMOL/L — SIGNIFICANT CHANGE UP (ref 3.5–5.3)
RBC # BLD: 3.39 M/UL — LOW (ref 4.2–5.8)
RBC # FLD: 12.9 % — SIGNIFICANT CHANGE UP (ref 10.3–14.5)
SODIUM SERPL-SCNC: 145 MMOL/L — SIGNIFICANT CHANGE UP (ref 135–145)
WBC # BLD: 6.55 K/UL — SIGNIFICANT CHANGE UP (ref 3.8–10.5)
WBC # FLD AUTO: 6.55 K/UL — SIGNIFICANT CHANGE UP (ref 3.8–10.5)

## 2018-10-23 RX ORDER — AMLODIPINE BESYLATE 2.5 MG/1
5 TABLET ORAL
Qty: 0 | Refills: 0 | COMMUNITY

## 2018-10-23 RX ORDER — QUETIAPINE FUMARATE 200 MG/1
1 TABLET, FILM COATED ORAL
Qty: 0 | Refills: 0 | COMMUNITY
Start: 2018-10-23

## 2018-10-23 RX ORDER — PREGABALIN 225 MG/1
1000 CAPSULE ORAL
Qty: 0 | Refills: 0 | COMMUNITY
Start: 2018-10-23

## 2018-10-23 RX ORDER — ATENOLOL 25 MG/1
1 TABLET ORAL
Qty: 0 | Refills: 0 | COMMUNITY

## 2018-10-23 RX ORDER — FOLIC ACID 0.8 MG
1 TABLET ORAL
Qty: 0 | Refills: 0 | COMMUNITY
Start: 2018-10-23

## 2018-10-23 RX ORDER — TAMSULOSIN HYDROCHLORIDE 0.4 MG/1
1 CAPSULE ORAL
Qty: 0 | Refills: 0 | COMMUNITY
Start: 2018-10-23

## 2018-10-23 RX ORDER — FINASTERIDE 5 MG/1
1 TABLET, FILM COATED ORAL
Qty: 0 | Refills: 0 | COMMUNITY
Start: 2018-10-23

## 2018-10-23 RX ORDER — AMLODIPINE BESYLATE 2.5 MG/1
10 TABLET ORAL
Qty: 0 | Refills: 0 | COMMUNITY

## 2018-10-23 RX ORDER — SODIUM CHLORIDE 9 MG/ML
500 INJECTION INTRAMUSCULAR; INTRAVENOUS; SUBCUTANEOUS ONCE
Qty: 0 | Refills: 0 | Status: COMPLETED | OUTPATIENT
Start: 2018-10-23 | End: 2018-10-23

## 2018-10-23 RX ORDER — QUETIAPINE FUMARATE 200 MG/1
12.5 TABLET, FILM COATED ORAL
Qty: 0 | Refills: 0 | COMMUNITY
Start: 2018-10-23

## 2018-10-23 RX ORDER — SODIUM CHLORIDE 9 MG/ML
1000 INJECTION INTRAMUSCULAR; INTRAVENOUS; SUBCUTANEOUS
Qty: 0 | Refills: 0 | Status: DISCONTINUED | OUTPATIENT
Start: 2018-10-23 | End: 2018-10-23

## 2018-10-23 RX ADMIN — ATENOLOL 25 MILLIGRAM(S): 25 TABLET ORAL at 06:28

## 2018-10-23 RX ADMIN — SODIUM CHLORIDE 500 MILLILITER(S): 9 INJECTION INTRAMUSCULAR; INTRAVENOUS; SUBCUTANEOUS at 12:52

## 2018-10-23 RX ADMIN — SODIUM CHLORIDE 75 MILLILITER(S): 9 INJECTION INTRAMUSCULAR; INTRAVENOUS; SUBCUTANEOUS at 11:43

## 2018-10-23 RX ADMIN — AMLODIPINE BESYLATE 10 MILLIGRAM(S): 2.5 TABLET ORAL at 06:27

## 2018-10-23 RX ADMIN — PREGABALIN 1000 MICROGRAM(S): 225 CAPSULE ORAL at 11:43

## 2018-10-23 RX ADMIN — Medication 1 MILLIGRAM(S): at 11:43

## 2018-10-23 RX ADMIN — HEPARIN SODIUM 5000 UNIT(S): 5000 INJECTION INTRAVENOUS; SUBCUTANEOUS at 06:27

## 2018-10-23 RX ADMIN — FINASTERIDE 5 MILLIGRAM(S): 5 TABLET, FILM COATED ORAL at 11:43

## 2018-10-23 NOTE — DISCHARGE NOTE ADULT - MEDICATION SUMMARY - MEDICATIONS TO TAKE
I will START or STAY ON the medications listed below when I get home from the hospital:    finasteride 5 mg oral tablet  -- 1 tab(s) by mouth once a day  -- Indication: For BPH    tamsulosin 0.4 mg oral capsule  -- 1 cap(s) by mouth once a day (at bedtime)  -- Indication: For BPH    QUEtiapine 50 mg oral tablet  -- 1 tab(s) by mouth once a day (at bedtime)  -- Indication: For Agitation/Dementia     QUEtiapine  -- 12.5 milligram(s) by mouth once a day  -- Indication: For Agitation    amLODIPine  -- 5 milligram(s) by mouth once a day  -- Indication: For HTN    folic acid 1 mg oral tablet  -- 1 tab(s) by mouth once a day  -- Indication: For supplement     cyanocobalamin 1000 mcg/mL injectable solution  -- 1000 microgram(s) injectable once a week x 4 doses, the monthly   -- Indication: For B12 defficiency

## 2018-10-23 NOTE — PROGRESS NOTE ADULT - PROVIDER SPECIALTY LIST ADULT
Hospitalist
Infectious Disease
Palliative Care
Hospitalist
Palliative Care

## 2018-10-23 NOTE — DISCHARGE NOTE ADULT - CARE PROVIDER_API CALL
Kailash Morel), Urology Surgery  [address] [place] [address]  Phone: [phone]  Fax: [phone]

## 2018-10-23 NOTE — DISCHARGE NOTE ADULT - HOSPITAL COURSE
89 year old Man with pmhx of HTN, dementia, BIBA accompanied by spouse for worsening confusion. As per wife and niece pt's confusion has worsened recently. Spouse reports pt is usually very confused at nights and less in the day time, does not recognize her or family members, but his confusion has worsened within the past 24 hours. Spouse and niece that pt is ambulatory at baseline, able to toilet self. Pt was c/o not being able to urinate and pain with urination    In ED CT head negative for acute pathology, K 5.5, Crt 1.73, /56. UA + UCs sent,  Pt was started on IV ABXS, Beaulieu placed with 700ml output. UCX back and negative, ABXS discontinued. Pt was evaluated by urologist as failed voiding trial, was started on Flomax and Finasteride, had another voiding trial again and failed, Beaulieu placed. Pt will need further follow with Urologist outPt.  Also Pt found to have  Low B12 and started on SQ injections, received 4. Need to c/w QWeekly injections and then QMonthy. Hospital course notable for low BP, Likely due to Low PO intake, s/p IVF , now has good appetite, BP stable.   Today Pt  awake, alert and comfortable, more participate in conversation. Reports some pressure like " needs to go" ( before beaulieu placed back ). No other complains. D/c planning d/w family at bedside   Vital Signs Last 24 Hrs  T(C): 36.2 (23 Oct 2018 11:15), Max: 36.7 (23 Oct 2018 05:09)  T(F): 97.2 (23 Oct 2018 11:15), Max: 98 (23 Oct 2018 05:09)  HR: 55 (23 Oct 2018 13:58) (55 - 72)  BP: 128/51 (23 Oct 2018 13:58) (80/38 - 146/54)  RR: 18 (23 Oct 2018 11:15) (18 - 20)  SpO2: 97% (23 Oct 2018 11:15) (97% - 97%)    PHYSICAL EXAM:  General: Well developed;  in no acute distress  Eyes: PERRLA, EOMI; conjunctiva and sclera clear  Head: Normocephalic; atraumatic  ENMT: No nasal discharge; airway clear  Neck: Supple; noJVD  Respiratory: Good air entry b/l, No wheezes, rales or rhonchi  Cardiovascular: Regular rate and rhythm. S1 and S2 Normal; No murmurs  Gastrointestinal: Soft non-tender non-distended; Normal bowel sounds  Genitourinary: No costovertebral angle tenderness, + suprapubic fulless  Extremities: Normal range of motion, No edema  Vascular: Peripheral pulses palpable 2+ bilaterally  Neurological: awake and alert, oriented to self and family members   Skin: Warm and dry. No acute rash  Lymph Nodes: No acute cervical adenopathy  Musculoskeletal: Normal muscle tone, without deformities  Psychiatric: cooperative     PLAN:   1. AMS likely  metabolic encephalopathy  due to Urinary retention and Dehydration, baseline Alzheimers' dementia   - C/w  1:1 at bedside for safety  - fall precautions  - supportive care   - c/w   seroquel 12.5 mg am  standing , c/w seroquel to 50mg QHS standing     -PT   - SW    2. CORINA /CKD stage 3 prerenal    - CR improved with IVF, now likely at baseline   - C/w  Beaulieu  - monitor renal Fx      3. Essential HTN  - BP  was low this am, no signs of sepsis. Likely due to min Po intake yesterday from sedation. Dehydration, BUN elevated   - S/p 500ml bolus  - BP stable  - BP meds adjusted       4. Urinary retention. with Beaulieu. BPH, possible prostate CA    - Failed Voiding trial, Foler placed   - c/w Flomax and finasteride   - F/u with Dr Morel       5. Vit B12 def  start supplementation 1000mcg SQ QD  got 4 doses, continue Qweekly       Dispo:  stable for d/c to [facility]

## 2018-10-23 NOTE — DISCHARGE NOTE ADULT - CARE PLAN
Principal Discharge DX:	Dementia  Goal:	control agitation  Assessment and plan of treatment:	c/w Seroquel  Secondary Diagnosis:	Enlarged prostate  Assessment and plan of treatment:	C/w Jimenez and meds  F/u with urology  Secondary Diagnosis:	Essential hypertension  Assessment and plan of treatment:	c/w reduced doses of amlodipine and atenolol

## 2018-10-23 NOTE — DISCHARGE NOTE ADULT - PATIENT PORTAL LINK FT
You can access the [facility] Patient Portal, offered by [facility], by registering with the following website: [email or web address]

## 2018-10-23 NOTE — PROGRESS NOTE ADULT - ASSESSMENT
89y old Male coming from home with hx of Dementia (since 2008, FAST 5), HTN, admitted 10/16 for worsening confusion. As per wife and niece pt's confusion has worsened recently mostly confused at nights and less in the day time, does not recognize her or family members, but his confusion has worsened within the past 24 hours. Spouse and niece note that pt is ambulatory at baseline (without any device), able to dress, toilet, and feed self. Found here to have mild elevation in Cr (1.7), hyperkalemia (5.5), and CTH negative for acute pathology, pending urinary studies with 600cc on bedside bladder scan. Palliative Care consulted to assist with establishing GOC.     1) AMS/Dementia  - hx of dementia, FAST5-6, not end stage yet  - CTH ruling out any acute pathology  - likely sundowning  - now less likely contributed to by infection, UCx negative thus far  - c/w seroquel dose to 50mg qhs and rec 25mg standing during the day  - frequent reorientation  - c/w with back up dose of haldol 2mg IM q4h prn    2) ?Infection  - ID notes appreciated  - UA mildly positive but no other signs of infection  - UCx negative    3) CORINA  - likely prerenal  - IVF  - encourage po intake, which wife says he usually drinks well  - c/w monitoring    4) Debility  - seems to have good physical function at home until today  - some evidence of muscle/fat wasting  - PT eval appreciated- RA[facility]A or LTC recommended    5) Prognosis  - likely overall poor  - progressive dementia, though not yet at end stage. Unclear though what new baseline will be. If does not rally or continues to decline would possibly then be a hospice candidate, not so at this time.     6) GOC/Advanced Directives  - pt does not have capacity for decision making  - no HCP, surrogate is wife: Vivienne: [phone]  - DNR and DNI. MOLST completed: DNR, limited medical interventions, DNI, send to hospital, no feeding tube, trial of IVF, determine use of abx, no dialysis, ok for transfusions, no ICU, no pressors  - GOC meeting held 10/17 and completed 10/18- family hoping for support in figuring out how best to support him after this, interested in [facility] assessment and open to full discussion of all options. Floor SW to work with pt's family cc: placement    Thank you for including us in Mr. Salazar's care. Will continue to follow with you.    Codey Stubbs MD  Palliative Care Attending

## 2018-10-23 NOTE — PROGRESS NOTE ADULT - REASON FOR ADMISSION
Worsening confusion

## 2018-10-23 NOTE — DISCHARGE NOTE ADULT - PLAN OF CARE
control agitation c/w Seroquel C/w Jimenez and meds  F/u with urology c/w reduced doses of amlodipine and atenolol

## 2018-10-23 NOTE — DISCHARGE NOTE ADULT - MEDICATION SUMMARY - MEDICATIONS TO CHANGE
I will SWITCH the dose or number of times a day I take the medications listed below when I get home from the hospital:    atenolol 50 mg oral tablet  -- 1 tab(s) by mouth once a day    amLODIPine  -- 10 milligram(s) by mouth once a day

## 2018-10-26 DIAGNOSIS — G93.41 METABOLIC ENCEPHALOPATHY: ICD-10-CM

## 2018-10-26 DIAGNOSIS — N17.9 ACUTE KIDNEY FAILURE, UNSPECIFIED: ICD-10-CM

## 2018-10-26 DIAGNOSIS — G30.9 ALZHEIMER'S DISEASE, UNSPECIFIED: ICD-10-CM

## 2018-10-26 DIAGNOSIS — F02.81 DEMENTIA IN OTHER DISEASES CLASSIFIED ELSEWHERE, UNSPECIFIED SEVERITY, WITH BEHAVIORAL DISTURBANCE: ICD-10-CM

## 2018-10-26 DIAGNOSIS — E86.0 DEHYDRATION: ICD-10-CM

## 2018-10-26 DIAGNOSIS — N39.0 URINARY TRACT INFECTION, SITE NOT SPECIFIED: ICD-10-CM

## 2018-10-26 DIAGNOSIS — Z66 DO NOT RESUSCITATE: ICD-10-CM

## 2018-10-26 DIAGNOSIS — R33.8 OTHER RETENTION OF URINE: ICD-10-CM

## 2018-10-26 DIAGNOSIS — E87.5 HYPERKALEMIA: ICD-10-CM

## 2018-10-26 DIAGNOSIS — I12.9 HYPERTENSIVE CHRONIC KIDNEY DISEASE WITH STAGE 1 THROUGH STAGE 4 CHRONIC KIDNEY DISEASE, OR UNSPECIFIED CHRONIC KIDNEY DISEASE: ICD-10-CM

## 2018-10-26 DIAGNOSIS — E44.0 MODERATE PROTEIN-CALORIE MALNUTRITION: ICD-10-CM

## 2018-10-26 DIAGNOSIS — F05 DELIRIUM DUE TO KNOWN PHYSIOLOGICAL CONDITION: ICD-10-CM

## 2018-10-26 DIAGNOSIS — M17.9 OSTEOARTHRITIS OF KNEE, UNSPECIFIED: ICD-10-CM

## 2018-10-26 DIAGNOSIS — N40.1 BENIGN PROSTATIC HYPERPLASIA WITH LOWER URINARY TRACT SYMPTOMS: ICD-10-CM

## 2018-10-26 DIAGNOSIS — E53.8 DEFICIENCY OF OTHER SPECIFIED B GROUP VITAMINS: ICD-10-CM

## 2018-10-26 DIAGNOSIS — N18.3 CHRONIC KIDNEY DISEASE, STAGE 3 (MODERATE): ICD-10-CM

## 2019-08-02 NOTE — DISCHARGE NOTE ADULT - ADMISSION DATE +STARTOFVISITDATE
History  Chief Complaint   Patient presents with    Alcohol Intoxication     Pt brought in by EMS  Pt vomitted at the scene after spending the night drinking  Denies falling  20 yo male was at bar drinking heavily, felt sick, went outside and vomited several times  Cousin was with him, waited an hour to see if pt  [place] better but he didn't so they called EMS  EMS found him prone on the ground per their report  No LOC known  Pt  Denies pain  Feels cold  History provided by:  Patient, relative and EMS personnel   used: No        None       Past Medical History:   Diagnosis Date    Back problem     Psychiatric disorder        History reviewed  No pertinent surgical history  History reviewed  No pertinent family history  I have reviewed and agree with the history as documented  Social History     Tobacco Use    Smoking status: Light Tobacco Smoker     Packs/day: 0 20    Smokeless tobacco: Never Used   Substance Use Topics    Alcohol use: Yes     Comment: socially    Drug use: Yes     Types: Marijuana        Review of Systems   Unable to perform ROS: Mental status change       Physical Exam  Physical Exam   Constitutional: He appears well-developed and well-nourished  No distress  BP/Temp low   HENT:   Head: Normocephalic and atraumatic  Eyes: Pupils are equal, round, and reactive to light  Conjunctivae are normal  No scleral icterus  Neck:   Placed in collar on arrival   Cardiovascular: Normal rate, regular rhythm and normal heart sounds  No murmur heard  Pulmonary/Chest: Effort normal and breath sounds normal  No respiratory distress  He exhibits no tenderness  Abdominal: Soft  Bowel sounds are normal  He exhibits no distension  There is no tenderness  Musculoskeletal: Normal range of motion  He exhibits no edema, tenderness or deformity  Neurological: He is alert  No cranial nerve deficit  He exhibits normal muscle tone  Skin: Skin is warm and dry   No Statement Selected rash noted  He is not diaphoretic  No erythema  No pallor  Psychiatric: He has a normal mood and affect  His behavior is normal    Nursing note and vitals reviewed        Vital Signs  ED Triage Vitals [08/02/19 0432]   Temperature Pulse Respirations Blood Pressure SpO2   (!) 96 5 °F (35 8 °C) 69 18 101/60 100 %      Temp Source Heart Rate Source Patient Position - Orthostatic VS BP Location FiO2 (%)   Tympanic Monitor Sitting Left arm --      Pain Score       No Pain           Vitals:    08/02/19 0432 08/02/19 0515   BP: 101/60    Pulse: 69 72   Patient Position - Orthostatic VS: Sitting          Visual Acuity      ED Medications  Medications   sodium chloride 0 9 % bolus 1,000 mL (1,000 mL Intravenous New Bag 8/2/19 0448)   ondansetron (ZOFRAN) injection 4 mg (4 mg Intravenous Given 8/2/19 0451)       Diagnostic Studies  Results Reviewed     Procedure Component Value Units Date/Time    Comprehensive metabolic panel [[id number]] Collected:  08/02/19 0447    Lab Status:  Final result Specimen:  Blood from Arm, Left Updated:  08/02/19 0512     Sodium 137 mmol/L      Potassium 3 8 mmol/L      Chloride 100 mmol/L      CO2 26 mmol/L      ANION GAP 11 mmol/L      BUN 13 mg/dL      Creatinine 1 24 mg/dL      Glucose 87 mg/dL      Calcium 9 6 mg/dL      AST 15 U/L      ALT 29 U/L      Alkaline Phosphatase 87 U/L      Total Protein 8 1 g/dL      Albumin 4 5 g/dL      Total Bilirubin 0 30 mg/dL      eGFR 81 ml/min/1 73sq m     Narrative:       [place] guidelines for Chronic Kidney Disease (CKD):     Stage 1 with normal or high GFR (GFR > 90 mL/min/1 73 square meters)    Stage 2 Mild CKD (GFR = 60-89 mL/min/1 73 square meters)    Stage 3A Moderate CKD (GFR = 45-59 mL/min/1 73 square meters)    Stage 3B Moderate CKD (GFR = 30-44 mL/min/1 73 square meters)    Stage 4 Severe CKD (GFR = 15-29 mL/min/1 73 square meters)    Stage 5 End Stage CKD (GFR <15 mL/min/1 73 square meters)  Note: GFR calculation is accurate only with a steady state creatinine    Ethanol [[id number]]  (Abnormal) Collected:  08/02/19 0447    Lab Status:  Final result Specimen:  Blood from Arm, Left Updated:  08/02/19 0509     Ethanol Lvl 83 mg/dL     CBC and differential [[id number]]  (Abnormal) Collected:  08/02/19 0447    Lab Status:  Final result Specimen:  Blood from Arm, Left Updated:  08/02/19 0505     WBC 15 82 Thousand/uL      RBC 4 57 Million/uL      Hemoglobin 13 7 g/dL      Hematocrit 39 7 %      MCV 87 fL      MCH 30 0 pg      MCHC 34 5 g/dL      RDW 13 3 %      MPV 11 2 fL      Platelets 486 Thousands/uL      Neutrophils Relative 75 %      Lymphocytes Relative 18 %      Monocytes Relative 6 %      Eosinophils Relative 1 %      Basophils Relative 0 %      Neutrophils Absolute 11 79 Thousands/µL      Lymphocytes Absolute 2 83 Thousands/µL      Monocytes Absolute 0 96 Thousand/µL      Eosinophils Absolute 0 20 Thousand/µL      Basophils Absolute 0 04 Thousands/µL                  CT head without contrast   Final Result by Merlinda Joe, DO (08/02 1545)      No calvarial fracture or acute intracranial abnormality is seen  Other findings as above  CT CERVICAL SPINE - WITHOUT CONTRAST      INDICATION:   Head trauma, headache  COMPARISON:  None  TECHNIQUE:  CT examination of the cervical spine was performed without intravenous contrast   Contiguous axial images were obtained  Sagittal and coronal reconstructions were performed  Radiation dose length product (DLP) for this visit:  1017 06 mGy-cm  (accession [id number]), 452 46 mGy-cm  (accession [id number])  This examination, like all CT scans performed in the [facility], was performed utilizing techniques to    minimize radiation dose exposure, including the use of iterative reconstruction and automated exposure control  IMAGE QUALITY:  Diagnostic  FINDINGS:      ALIGNMENT:  Normal alignment of the cervical spine   No subluxation  VERTEBRAL BODIES:  No fracture  DEGENERATIVE CHANGES:  No significant cervical degenerative changes are noted  PREVERTEBRAL AND PARASPINAL SOFT TISSUES:  Unremarkable  THORACIC INLET:  Normal       IMPRESSION:      No evidence of acute cervical spine injury                         Workstation performed: [id number]         CT cervical spine without contrast   Final Result by Hayde Rome DO (08/02 7667)      No calvarial fracture or acute intracranial abnormality is seen  Other findings as above  CT CERVICAL SPINE - WITHOUT CONTRAST      INDICATION:   Head trauma, headache  COMPARISON:  None  TECHNIQUE:  CT examination of the cervical spine was performed without intravenous contrast   Contiguous axial images were obtained  Sagittal and coronal reconstructions were performed  Radiation dose length product (DLP) for this visit:  1017 06 mGy-cm  (accession [id number]), 452 46 mGy-cm  (accession [id number])  This examination, like all CT scans performed in the [facility], was performed utilizing techniques to    minimize radiation dose exposure, including the use of iterative reconstruction and automated exposure control  IMAGE QUALITY:  Diagnostic  FINDINGS:      ALIGNMENT:  Normal alignment of the cervical spine  No subluxation  VERTEBRAL BODIES:  No fracture  DEGENERATIVE CHANGES:  No significant cervical degenerative changes are noted  PREVERTEBRAL AND PARASPINAL SOFT TISSUES:  Unremarkable  THORACIC INLET:  Normal       IMPRESSION:      No evidence of acute cervical spine injury                         Workstation performed: [id number]                    Procedures  Procedures       ED Course                               MDM  Number of Diagnoses or Management Options  Alcohol intoxication [facility]):   Vomiting:   Diagnosis management comments: 5231 - still hasn't gone for CT scan    They are aware and will come get him  Pt's cousin will go home and come back in a couple hours when he is ready  Pt  Need to get IVF and get temp up  Will observe  0959 - CT scans ok  Plan to discharge  Cousin will be back between 7-8 am   Temp and BP improved and pt  Feels better  Collar removed  Disposition  Final diagnoses:   Alcohol intoxication ([facility] [address] )   Vomiting     Time reflects when diagnosis was documented in both MDM as applicable and the Disposition within this note     Time User Action Codes Description Comment    1/7/2231  6:19 AM Noni CASILLAS Add [O06 674] Alcohol intoxication ([facility] [address] )     9/9/2852  8:25 AM Iek Flores Add [X36 70] Vomiting       ED Disposition     ED Disposition Condition Date/Time Comment    Discharge Stable Fri Aug 2, 2019  6:13 AM Joy Tolliver discharge to home/self care  Follow-up Information     Follow up With Specialties Details Why Contact Kishore Chaidez MD Family Medicine Schedule an appointment as soon as possible for a visit  As needed [address] [facility]  [phone]            Patient's Medications    No medications on file     No discharge procedures on file      ED Provider  Electronically Signed by           Anastasia Garcia MD  08/30/89 510

## 2021-05-05 NOTE — PHYSICAL THERAPY INITIAL EVALUATION ADULT - GAIT DISTANCE, PT EVAL
Gen: Well appearing in NAD  Head: NC/AT  Neck: trachea midline  Cardiac: RRR  Resp:  No distress; CTAB  Abd: Soft, NT/ND  Ext: no deformities; right paralumbar TTP, no CVAT  Msk: Reproducible left sided chest wall TTP  Neuro:  A&O appears non focal  Skin:  Warm and dry as visualized  Psych:  Normal affect and mood 50 feet Gen: Well appearing in NAD  Head: NC/AT  Neck: trachea midline  Cardiac: RRR, Reproducible left sided chest wall TTP  Resp:  No distress; CTAB  Abd: Soft, NT/ND  Ext: no deformities; right paralumbar TTP, no CVAT  Neuro:  A&O appears non focal  Skin:  Warm and dry as visualized  Psych:  Normal affect and mood

## 2021-06-30 NOTE — ED PROVIDER NOTE - OBJECTIVE STATEMENT
Addended by: MAMADOU GONZALEZ on: 6/30/2021 05:01 PM     Modules accepted: Orders     89 yo male biba from home with wife for ams. wife states pt confusion began in 2008, it is getting worse and tonight she could not control him and prevent him from wandering. she is his sole caretaker. no ho fall

## 2023-02-14 NOTE — PATIENT PROFILE ADULT - HOW PATIENT ADDRESSED, PROFILE
Jaswinder Rifampin Counseling: I discussed with the patient the risks of rifampin including but not limited to liver damage, kidney damage, red-orange body fluids, nausea/vomiting and severe allergy.
